# Patient Record
Sex: MALE | Race: WHITE | NOT HISPANIC OR LATINO | Employment: OTHER | URBAN - METROPOLITAN AREA
[De-identification: names, ages, dates, MRNs, and addresses within clinical notes are randomized per-mention and may not be internally consistent; named-entity substitution may affect disease eponyms.]

---

## 2019-10-14 PROBLEM — R03.0 ELEVATED BLOOD PRESSURE READING: Status: ACTIVE | Noted: 2019-10-14

## 2019-10-14 PROBLEM — I35.0 NONRHEUMATIC AORTIC VALVE STENOSIS: Status: ACTIVE | Noted: 2019-10-14

## 2021-04-12 PROBLEM — K80.10 CHOLELITHIASIS AND CHOLECYSTITIS WITHOUT OBSTRUCTION: Status: ACTIVE | Noted: 2021-04-12

## 2021-04-12 PROBLEM — R73.9 HYPERGLYCEMIA: Status: ACTIVE | Noted: 2021-04-12

## 2021-04-12 PROBLEM — K80.20 CALCULUS OF GALLBLADDER WITHOUT CHOLECYSTITIS WITHOUT OBSTRUCTION: Status: ACTIVE | Noted: 2021-04-12

## 2021-11-08 PROBLEM — K81.1 CHRONIC CHOLECYSTITIS: Status: ACTIVE | Noted: 2021-04-12

## 2022-08-30 PROBLEM — R03.0 ELEVATED BLOOD PRESSURE READING: Status: RESOLVED | Noted: 2019-10-14 | Resolved: 2022-08-30

## 2023-01-11 ENCOUNTER — DOCUMENTATION (OUTPATIENT)
Dept: CARDIOLOGY | Facility: MEDICAL CENTER | Age: 68
End: 2023-01-11

## 2023-01-11 NOTE — PROGRESS NOTES
This patient has been flagged through our echocardiogram surveillance with the following echocardiogram results:    Moderate Aortic Stenosis    Ordering Provider: Dr. Cachorro Rojas    Current Plan of Care for Structural Heart Disease: Added to surveillance tracking list    Next Echo date needed by: 12/13/2023

## 2024-05-07 PROBLEM — G47.33 OBSTRUCTIVE SLEEP APNEA SYNDROME: Status: ACTIVE | Noted: 2024-05-07

## 2024-05-23 ENCOUNTER — TELEPHONE (OUTPATIENT)
Dept: CARDIOLOGY | Facility: MEDICAL CENTER | Age: 69
End: 2024-05-23

## 2024-05-23 DIAGNOSIS — I35.0 AORTIC VALVE STENOSIS, SEVERE: ICD-10-CM

## 2024-05-23 NOTE — TELEPHONE ENCOUNTER
S/W pt, relayed findings and need to see provider asap. Pt states he has had some intermittent chest tightness, SOB with excessive activity--none at rest, feeling fatigued. ED precautions given for worsening/severe cardiac symptoms and relayed what to watch for in detail. Msg sent to scheduling pool as urgent to get pt scheduled.     Pt unaware that he was overdue for f/u, last saw DA in 2022.    Referral placed for structural to discuss valve replacement per CW.

## 2024-05-23 NOTE — TELEPHONE ENCOUNTER
----- Message from Kendra Wade R.N. sent at 5/23/2024 11:35 AM PDT -----  Regarding: FW: His Aortic Stenosis is now severe  Hi Chandrika,  last seen by CLAUDETTE, please follow up per ADD workflow, thank you!  ----- Message -----  From: Yusuf Rosas M.D.  Sent: 5/23/2024  11:24 AM PDT  To: Kendra Wade R.N.; Aleida Hoang, Med Ass't; #  Subject: His Aortic Stenosis is now severe                Kendra can you call and let him know his valve has tightened further so he should see us back to talk about replacement.    Aleida please help to get him in with one of the cardiologists next available

## 2024-05-24 ENCOUNTER — TELEPHONE (OUTPATIENT)
Dept: CARDIOLOGY | Facility: MEDICAL CENTER | Age: 69
End: 2024-05-24

## 2024-05-24 DIAGNOSIS — Z01.810 PRE-PROCEDURAL CARDIOVASCULAR EXAMINATION: ICD-10-CM

## 2024-05-24 DIAGNOSIS — I35.0 AORTIC VALVE STENOSIS, SEVERE: ICD-10-CM

## 2024-05-24 NOTE — TELEPHONE ENCOUNTER
Currently holding time on 06- for a PRE TAVR Angio for 7:30 time with TW. Instruction  sheet emailed to Armando.  
You can access the FollowMyHealth Patient Portal offered by [facility] by registering at the following website: [email or web address]. By joining [employer]s FollowMyHealth portal, you will also be able to view your health information using other applications (apps) compatible with our system.

## 2024-05-24 NOTE — TELEPHONE ENCOUNTER
Referral from: Dr. Rafal Renee for sev AS    Patient called on 5/24/2024.     Discussed with patient consultation appointments, testing needed, and plan of care.    Patient given dates and times of testing and consultations.    All questions answered.    Phone number given to patient for [facility] for any further questions or concerns.

## 2024-05-28 NOTE — PROGRESS NOTES
REFERRING PHYSICIAN: Scotty De Dios MD.     CONSULTING PHYSICIAN: Rossana Bernstein MD     CHIEF COMPLAINT: Fatigue    HISTORY OF PRESENT ILLNESS: The patient is a 68 y.o. male with past medical history of hypothyroidism, obstructive sleep apnea, GERD, moderate aortic stenosis who presents to clinic. He complains of shortness of breath, occasional chest soreness and exertional fatigue over the past year. He denies orthopnea, PND, dizziness and syncope. Echocardiogram shows progression of aortic stenosis from moderate to severe. He is accompanied by his wife and daughter.     PAST MEDICAL HISTORY:   Active Ambulatory Problems     Diagnosis Date Noted    Postoperative hypothyroidism 10/26/2015    Gastroesophageal reflux disease without esophagitis 10/26/2015    Rotator cuff syndrome of right shoulder 01/01/2013    Preventative health care 01/01/2013    History of benign prostatic hyperplasia 01/01/2013    Erectile dysfunction 01/01/2013    Severe aortic stenosis 10/14/2019    Cholelithiasis and cholecystitis without obstruction 04/12/2021    Type 2 diabetes mellitus with hyperosmolarity without coma, without long-term current use of insulin ([facility]) 04/12/2021    Obstructive sleep apnea syndrome 05/07/2024     Resolved Ambulatory Problems     Diagnosis Date Noted    Mild mitral regurgitation by prior echocardiogram 09/01/2013    GERD (gastroesophageal reflux disease) 02/27/2013    Impaired fasting glucose 01/01/2013    Hypothyroidism 01/01/2013    Elevated blood pressure reading 10/14/2019     Past Medical History:   Diagnosis Date    BPH (benign prostatic hyperplasia)     Hyperglycemia 04/12/2021    Moderate aortic stenosis 10/14/2019    Nonrheumatic aortic valve stenosis 10/14/2019       PAST SURGICAL HISTORY:   Past Surgical History:   Procedure Laterality Date    CHOLECYSTECTOMY      Jeff    THYROID LOBECTOMY      TURP-VAPOR          ALLERGIES: No Known Allergies     CURRENT MEDICATIONS:   Current Outpatient  Medications:     metFORMIN (GLUCOPHAGE) 500 MG Tab, Take 1 tablet by mouth once daily, Disp: 90 Tablet, Rfl: 3    levothyroxine (SYNTHROID) 125 MCG Tab, Take 1 tablet by mouth once daily, Disp: 90 Tablet, Rfl: 1    FAMILY HISTORY:   Family History   Problem Relation Age of Onset    Prostate cancer Brother     Arthritis Other     Heart Disease Other     Hypertension Other     Stroke Other         SOCIAL HISTORY:   Social History     Socioeconomic History    Marital status:      Spouse name: Nichole    Number of children: 2    Years of education: Not on file    Highest education level: Not on file   Occupational History    Occupation: retired     Comment: [employer]   Tobacco Use    Smoking status: Never    Smokeless tobacco: Never   Vaping Use    Vaping status: Never Used   Substance and Sexual Activity    Alcohol use: Yes     Alcohol/week: 4.2 oz     Types: 7 Cans of beer per week    Drug use: Not on file    Sexual activity: Not on file   Other Topics Concern     Service No    Blood Transfusions Not Asked    Caffeine Concern No    Occupational Exposure Not Asked    Hobby Hazards Not Asked    Sleep Concern Not Asked    Stress Concern Not Asked    Weight Concern Not Asked    Special Diet Not Asked    Back Care Not Asked    Exercise No    Bike Helmet Not Asked    Seat Belt Yes    Self-Exams Not Asked   Social History Narrative    Not on file     Social Determinants of Health     Financial Resource Strain: Not on file   Food Insecurity: Not on file   Transportation Needs: Not on file   Physical Activity: Inactive (11/8/2021)    Exercise Vital Sign     Days of Exercise per Week: 0 days     Minutes of Exercise per Session: 0 min   Stress: Not on file   Social Connections: Not on file   Intimate Partner Violence: Not on file   Housing Stability: Not on file       REVIEW OF SYSTEMS:  Review of Systems   Constitutional:  Positive for malaise/fatigue. Negative for chills, fever and weight loss.   HENT:   "Negative for ear pain, nosebleeds and tinnitus.    Eyes:  Negative for double vision, photophobia and pain.   Respiratory:  Positive for shortness of breath. Negative for cough and hemoptysis.    Cardiovascular:  Negative for chest pain, palpitations, orthopnea, leg swelling and PND.   Gastrointestinal:  Negative for abdominal pain, blood in stool, nausea and vomiting.   Genitourinary:  Negative for frequency, hematuria and urgency.   Musculoskeletal: Negative.    Skin:  Negative for rash.   Neurological:  Negative for dizziness, tremors, speech change, focal weakness, seizures and headaches.   Endo/Heme/Allergies:  Negative for polydipsia. Does not bruise/bleed easily.   Psychiatric/Behavioral:  Negative for hallucinations and memory loss.      PHYSICAL EXAMINATION:    /72 (BP Location: Left arm, Patient Position: Sitting, BP Cuff Size: Adult)   Pulse 86   Temp 36.6 °C (97.8 °F) (Temporal)   Ht 1.854 m (6' 1\")   Wt 102 kg (224 lb)   SpO2 94%   BMI 29.55 kg/m²    Physical Exam  Vitals reviewed.   Constitutional:       General: He is not in acute distress.     Appearance: Normal appearance.   HENT:      Head: Normocephalic and atraumatic.      Right Ear: External ear normal.      Left Ear: External ear normal.      Nose: Nose normal. No congestion.   Eyes:      General: No scleral icterus.     Extraocular Movements: Extraocular movements intact.      Conjunctiva/sclera: Conjunctivae normal.   Cardiovascular:      Rate and Rhythm: Normal rate and regular rhythm.   Pulmonary:      Effort: Pulmonary effort is normal. No respiratory distress.   Abdominal:      General: There is no distension.      Palpations: Abdomen is soft.   Musculoskeletal:         General: Normal range of motion.      Cervical back: Normal range of motion.   Skin:     General: Skin is warm and dry.      Coloration: Skin is not jaundiced.      Findings: No rash.   Neurological:      Mental Status: He is alert and oriented to person, " "place, and time.      Cranial Nerves: No cranial nerve deficit.   Psychiatric:         Mood and Affect: Mood normal.         Behavior: Behavior normal.       LABS REVIEWED:  Lab Results   Component Value Date/Time    SODIUM 140 06/04/2024 12:55 PM    POTASSIUM 4.1 06/04/2024 12:55 PM    CHLORIDE 104 06/04/2024 12:55 PM    CO2 28 06/04/2024 12:55 PM    GLUCOSE 108 (H) 06/04/2024 12:55 PM    BUN 18 06/04/2024 12:55 PM    CREATININE 1.1 06/04/2024 12:55 PM    BUNCREATRAT 16 07/26/2017 10:15 AM      No results found for: \"PROTHROMBTM\", \"INR\"   Lab Results   Component Value Date/Time    WBC 5.5 06/22/2023 08:12 AM    RBC 5.09 06/22/2023 08:12 AM    HEMOGLOBIN 15.2 06/22/2023 08:12 AM    HEMATOCRIT 44.7 06/22/2023 08:12 AM    MCV 87.8 06/22/2023 08:12 AM    MCH 29.9 06/22/2023 08:12 AM    MCHC 34.0 06/22/2023 08:12 AM    MPV 12.2 (H) 06/22/2023 08:12 AM        IMAGING REVIEWED AND INTERPRETED:    ECHOCARDIOGRAM   5/23/24 @ [facility]  CONCLUSIONS  Compared to the images of the prior study on 12/13/2022 - aortic   stenosis is now severe, previously moderate.  Normal left ventricular systolic function.  The left ventricular ejection fraction is visually estimated to be 60%.  Normal diastolic function.  The right ventricle is normal in size and systolic function.  Severe aortic valve stenosis. (AV Peak Velo: 4.4m/s; AV mean gradient: 50.2mmHg; PARMINDER: 0.67cm2)  Mild aortic insufficiency.    CARDIAC CATHETERIZATION pending    CT SCAN CHEST   Performed this morning; read pending      IMPRESSION:  67 yo gentleman with known conditions of hypothyroidism, obstructive sleep apnea, GERD has been monitored for progression of known aortic stenosis. He now has severe symptomatic aortic stenosis.       PLAN:  I recommend that the patient is a reasonable TAVR candidate given patient preference and age. We will continue to follow workup and discuss in multidisciplinary conference.    The STS mortality risk score is 0.5% and the morbidity and " mortality risk score is 4.3%. The scores were discussed with patient.    Thank you for this very challenging consultation and participation in the patient’s care.  I will keep you apprised of all future developments.          Sincerely,       Rossana Bernstein MD.

## 2024-06-05 ENCOUNTER — OFFICE VISIT (OUTPATIENT)
Dept: CARDIOLOGY | Facility: MEDICAL CENTER | Age: 69
End: 2024-06-05
Attending: INTERNAL MEDICINE
Payer: MEDICARE

## 2024-06-05 ENCOUNTER — TELEPHONE (OUTPATIENT)
Dept: CARDIOLOGY | Facility: MEDICAL CENTER | Age: 69
End: 2024-06-05

## 2024-06-05 ENCOUNTER — OFFICE VISIT (OUTPATIENT)
Dept: CARDIOTHORACIC SURGERY | Facility: MEDICAL CENTER | Age: 69
End: 2024-06-05
Payer: MEDICARE

## 2024-06-05 ENCOUNTER — DOCUMENTATION (OUTPATIENT)
Dept: CARDIOLOGY | Facility: MEDICAL CENTER | Age: 69
End: 2024-06-05

## 2024-06-05 ENCOUNTER — HOSPITAL ENCOUNTER (OUTPATIENT)
Dept: RADIOLOGY | Facility: MEDICAL CENTER | Age: 69
End: 2024-06-05
Payer: MEDICARE

## 2024-06-05 ENCOUNTER — APPOINTMENT (OUTPATIENT)
Dept: ADMISSIONS | Facility: MEDICAL CENTER | Age: 69
End: 2024-06-05
Attending: INTERNAL MEDICINE
Payer: MEDICARE

## 2024-06-05 VITALS
OXYGEN SATURATION: 99 % | WEIGHT: 224 LBS | DIASTOLIC BLOOD PRESSURE: 76 MMHG | BODY MASS INDEX: 29.69 KG/M2 | RESPIRATION RATE: 16 BRPM | HEART RATE: 67 BPM | HEIGHT: 73 IN | SYSTOLIC BLOOD PRESSURE: 128 MMHG

## 2024-06-05 VITALS
BODY MASS INDEX: 29.69 KG/M2 | OXYGEN SATURATION: 94 % | HEART RATE: 86 BPM | TEMPERATURE: 97.8 F | DIASTOLIC BLOOD PRESSURE: 72 MMHG | HEIGHT: 73 IN | WEIGHT: 224 LBS | SYSTOLIC BLOOD PRESSURE: 124 MMHG

## 2024-06-05 DIAGNOSIS — I35.0 NONRHEUMATIC AORTIC VALVE STENOSIS: ICD-10-CM

## 2024-06-05 DIAGNOSIS — Z01.810 PRE-PROCEDURAL CARDIOVASCULAR EXAMINATION: ICD-10-CM

## 2024-06-05 DIAGNOSIS — Z00.6 EXAMINATION OF PARTICIPANT IN CLINICAL TRIAL: ICD-10-CM

## 2024-06-05 DIAGNOSIS — I35.0 AORTIC VALVE STENOSIS, SEVERE: ICD-10-CM

## 2024-06-05 DIAGNOSIS — I35.0 SEVERE AORTIC STENOSIS: ICD-10-CM

## 2024-06-05 DIAGNOSIS — Z01.810 PRE-OPERATIVE CARDIOVASCULAR EXAMINATION: ICD-10-CM

## 2024-06-05 PROBLEM — E11.00 TYPE 2 DIABETES MELLITUS WITH HYPEROSMOLARITY WITHOUT COMA, WITHOUT LONG-TERM CURRENT USE OF INSULIN (HCC): Status: ACTIVE | Noted: 2021-04-12

## 2024-06-05 PROCEDURE — 3078F DIAST BP <80 MM HG: CPT | Performed by: INTERNAL MEDICINE

## 2024-06-05 PROCEDURE — 71275 CT ANGIOGRAPHY CHEST: CPT

## 2024-06-05 PROCEDURE — G2211 COMPLEX E/M VISIT ADD ON: HCPCS | Performed by: INTERNAL MEDICINE

## 2024-06-05 PROCEDURE — 99205 OFFICE O/P NEW HI 60 MIN: CPT | Performed by: INTERNAL MEDICINE

## 2024-06-05 PROCEDURE — 99213 OFFICE O/P EST LOW 20 MIN: CPT | Mod: 25 | Performed by: INTERNAL MEDICINE

## 2024-06-05 PROCEDURE — 99205 OFFICE O/P NEW HI 60 MIN: CPT | Performed by: THORACIC SURGERY (CARDIOTHORACIC VASCULAR SURGERY)

## 2024-06-05 PROCEDURE — 700117 HCHG RX CONTRAST REV CODE 255

## 2024-06-05 PROCEDURE — 3074F SYST BP LT 130 MM HG: CPT | Performed by: INTERNAL MEDICINE

## 2024-06-05 PROCEDURE — 3078F DIAST BP <80 MM HG: CPT | Performed by: THORACIC SURGERY (CARDIOTHORACIC VASCULAR SURGERY)

## 2024-06-05 PROCEDURE — 3074F SYST BP LT 130 MM HG: CPT | Performed by: THORACIC SURGERY (CARDIOTHORACIC VASCULAR SURGERY)

## 2024-06-05 RX ADMIN — IOHEXOL 100 ML: 350 INJECTION, SOLUTION INTRAVENOUS at 08:00

## 2024-06-05 ASSESSMENT — ENCOUNTER SYMPTOMS
SHORTNESS OF BREATH: 1
MEMORY LOSS: 0
DOUBLE VISION: 0
FOCAL WEAKNESS: 0
SPEECH CHANGE: 0
DIZZINESS: 0
SEIZURES: 0
HEMOPTYSIS: 0
HEADACHES: 0
PHOTOPHOBIA: 0
TREMORS: 0
VOMITING: 0
CHILLS: 0
BRUISES/BLEEDS EASILY: 0
COUGH: 0
MUSCULOSKELETAL NEGATIVE: 1
PALPITATIONS: 0
BLOOD IN STOOL: 0
ABDOMINAL PAIN: 0
EYE PAIN: 0
NAUSEA: 0
PND: 0
WEIGHT LOSS: 0
HALLUCINATIONS: 0
POLYDIPSIA: 0
ORTHOPNEA: 0
FEVER: 0

## 2024-06-05 ASSESSMENT — FIBROSIS 4 INDEX
FIB4 SCORE: 1.2
FIB4 SCORE: 1.2

## 2024-06-05 ASSESSMENT — PATIENT HEALTH QUESTIONNAIRE - PHQ9: CLINICAL INTERPRETATION OF PHQ2 SCORE: 0

## 2024-06-05 NOTE — TELEPHONE ENCOUNTER
----- Message from Dori Irby R.N. sent at 5/24/2024  1:59 PM PDT -----  Regarding: pre tavr cath  Please hold pre tavr cath 6/11. Thanks!

## 2024-06-05 NOTE — TELEPHONE ENCOUNTER
----- Message from Dori Irby R.N. sent at 5/24/2024  1:59 PM PDT -----  Regarding: pre tavr cath  Please hold pre tavr cath 6/11. Thanks!    
Patient is scheduled on 6-11-24 for a Pre TAVR angio with Dr. Gallagher.Patient was told to hold Metformin day of and 48hrs after. Patient to check in at 6:00 for a 7:30 procedure. H&P was done on 6-5-24 by Dr. De Dios. Pre admit to call patient.  
Scheduled case with Alan. Emailed case to Giovanna Phillips and Alix.  
n/a

## 2024-06-05 NOTE — PROGRESS NOTES
"CARDIOLOGY STRUCTURAL HEART CONSULTATION    PCP: Rafal Renee M.D.  REFERRING: Yusuf Rosas MD    1. Nonrheumatic aortic valve stenosis    2. Pre-operative cardiovascular examination        Venkat Aivtia has class II D1 aortic stenosis.  Advise moving forward with aortic valve replacement-TAVR versus SAVR pending heart team evaluation.  He will complete the usual prerequisite testing of a CT, coronary angiogram and CTS evaluation.    We discussed the risks, benefits and alternatives to TAVR including but not limited to a 10% risk of pacemaker, 5% risk of bleeding/access site complication, 2% risk of stroke, risk of contrast nephropathy and 2% risk of mortality. The patient is in agreement with proceeding.      Follow up: 6 wks    History: Venkat Avitia is a 68 y.o. male active man who presents for shortness of breath exertional fatigue over the past year.  Rides dirt bikes, offroad vehicles during which she has noticed some of the symptoms.  Echocardiogram showing progression from moderate to severe.      ROS:   10 point review systems is otherwise negative except as per the HPI    PE:  /76 (BP Location: Left arm, Patient Position: Sitting, BP Cuff Size: Adult)   Pulse 67   Resp 16   Ht 1.854 m (6' 1\")   Wt 102 kg (224 lb)   SpO2 99%   BMI 29.55 kg/m²   GEN: NAD  CARDIAC: Systolic ejection murmur  RESP: Clear to auscultation bilaterally  ABD: Soft, non-tender, non-distended  EXT: No edema  NEURO: No focal deficit    () Today's E/M visit is associated with medical care services that serve as the continuing focal point for all needed health care services and/or with medical care services that  are part of ongoing care related to a patient's single, serious condition, or a complex condition: This includes  furnishing services to patients on an ongoing basis that result in care that is personalized  to the patient. The services result in a comprehensive, longitudinal, and " continuous  relationship with the patient and involve delivery of team-based care that is accessible, coordinated with other practitioners and providers, and integrated with the broader health  care landscape.     Labs collected 3/22/2024 creatinine 1.1, AST/ALT normal, bilirubin normal, glucose 101, potassium 4.5, hematocrit 45.7, hemoglobin 14.8    I interpreted the echocardiogram dated 5/23/2024 which shows severe valvular aortic stenosis, normal LVEF, mild aortic insufficiency    Past Medical History:   Diagnosis Date    BPH (benign prostatic hyperplasia)     Hyperglycemia 04/12/2021    Mild mitral regurgitation by prior echocardiogram 09/2013    nl ef and mild concentric LVH    Moderate aortic stenosis 10/14/2019    Nonrheumatic aortic valve stenosis 10/14/2019    Postoperative hypothyroidism 10/26/2015     Past Surgical History:   Procedure Laterality Date    CHOLECYSTECTOMY      Jeff    THYROID LOBECTOMY      TURP-VAPOR       No Known Allergies  Outpatient Encounter Medications as of 6/5/2024   Medication Sig Dispense Refill    metFORMIN (GLUCOPHAGE) 500 MG Tab Take 1 tablet by mouth once daily 90 Tablet 3    levothyroxine (SYNTHROID) 125 MCG Tab Take 1 tablet by mouth once daily 90 Tablet 1    Levothyroxine Sodium 125 MCG Cap Take 1 Capsule by mouth every day. 90 Capsule 3    pantoprazole (PROTONIX) 40 MG Tablet Delayed Response Take 1 tablet by mouth once daily (Patient not taking: Reported on 5/7/2024) 90 Tab 1     No facility-administered encounter medications on file as of 6/5/2024.     Social History     Socioeconomic History    Marital status:      Spouse name: Nichole    Number of children: 2    Years of education: Not on file    Highest education level: Not on file   Occupational History    Occupation: retired     Comment: [employer]   Tobacco Use    Smoking status: Never    Smokeless tobacco: Never   Vaping Use    Vaping status: Never Used   Substance and Sexual Activity    Alcohol use:  "Yes     Alcohol/week: 4.2 oz     Types: 7 Cans of beer per week    Drug use: Not on file    Sexual activity: Not on file   Other Topics Concern     Service No    Blood Transfusions Not Asked    Caffeine Concern No    Occupational Exposure Not Asked    Hobby Hazards Not Asked    Sleep Concern Not Asked    Stress Concern Not Asked    Weight Concern Not Asked    Special Diet Not Asked    Back Care Not Asked    Exercise No    Bike Helmet Not Asked    Seat Belt Yes    Self-Exams Not Asked   Social History Narrative    Not on file     Social Determinants of Health     Financial Resource Strain: Not on file   Food Insecurity: Not on file   Transportation Needs: Not on file   Physical Activity: Inactive (11/8/2021)    Exercise Vital Sign     Days of Exercise per Week: 0 days     Minutes of Exercise per Session: 0 min   Stress: Not on file   Social Connections: Not on file   Intimate Partner Violence: Not on file   Housing Stability: Not on file     Family History   Problem Relation Age of Onset    Prostate cancer Brother     Arthritis Other     Heart Disease Other     Hypertension Other     Stroke Other          Studies  Lab Results   Component Value Date/Time    CHOLSTRLTOT 134 08/17/2023 08:19 AM    LDL 50 08/17/2023 08:19 AM    HDL 63.0 (H) 08/17/2023 08:19 AM    TRIGLYCERIDE 105 08/17/2023 08:19 AM       Lab Results   Component Value Date/Time    SODIUM 140 06/04/2024 12:55 PM    POTASSIUM 4.1 06/04/2024 12:55 PM    CHLORIDE 104 06/04/2024 12:55 PM    CO2 28 06/04/2024 12:55 PM    GLUCOSE 108 (H) 06/04/2024 12:55 PM    BUN 18 06/04/2024 12:55 PM    CREATININE 1.1 06/04/2024 12:55 PM    BUNCREATRAT 16 07/26/2017 10:15 AM     Lab Results   Component Value Date/Time    ALKPHOSPHAT 67 06/04/2024 12:55 PM    ASTSGOT 21 06/04/2024 12:55 PM    ALTSGPT 36 06/04/2024 12:55 PM    TBILIRUBIN 0.4 06/04/2024 12:55 PM      No results found for: \"HGB\"       "

## 2024-06-05 NOTE — PROGRESS NOTES
Valve Program Functional Assessment:     KCCQ12   1a) Showering/bathin  1b) Walking 1 block on ground: 4  1c) Hurrying or joggin  2) Swellin  3) Fatigue: 7  4) Shortness of breath: 7  5) Sleep sitting up: 5  6) Limited enjoyment of life: 4  7) Spend the rest of your life with HF: 2  8a) Hobbies, recreational activities:3  8b) Working or doing household chores:3  8c) Visiting family or friends: 5    5 meter walk test  1) __3.01____ s/5m  2) __3.25____ s/5m  3) __3.63____ s/5m  AVG:_3.29______     Strength   1) _38_____ kg  2) _38_____ kg  3) _36_____ kg  AVG:__37.3____    HARKINS ADLs  Patient independently preforms...   - Bathing: Yes   - Dressing: Yes   - Toileting: Yes   - Transferring: Yes   - Continence: Yes   - Feeding: Yes   Total Score: _6_/6    Living Situation  Patient lives: with spouse,    Mobility Aids   Patient uses: none      FRAILTY SCORE: _0_/ 4

## 2024-06-05 NOTE — PROGRESS NOTES
"Valve Program Consultation: 6/5/2024 for tentative TAVR 6/17/2024    Mental Status Assessment:  Appearance: normal  Behavior: normal  Mood/Affect: normal  Thought process/content: normal  Cognition: normal  Functional ability: normal  Dental Concerns: natural teeth with crowns; patient denies s/s of active oral infection/irritation  Further mental assessment needed: no    Post-op Plan of Care:  Support systems: spouse Nichole, daughter Karen both at consult today and participating in the discussion  Patient understands discharge plan: yes  DME: hearing aids, glasses  Home health warranted prior to procedure: no  Social concerns for discharge: none  PCP alerted of social concerns: no  POLST: none  Advance directive: DPOA-HC on file dated 1/28/2020  Post-op goal: \"have less fatigue so I can be more active, overall better health\"   Medication instructions: hold vitamins and supplements 7 days prior, hold Metformin the morning of the procedure    Concerns prior to procedure: none    Met with patient during New TAVR consult.     All patient's questions and concerns were addressed during this visit. They understood pre-operative and post-operative plan of care.    Reviewed patient TAVR education packet explaining that information is provided regarding preparation for TAVR the night prior, what to expect during the hospital stay, average LOS, and what to look out for post TAVR discharge. Explained that patient will require SBE prophylactic antibiotic prior to any dental treatment post TAVR. Advised patient not to receive any new vaccinations within 1 week pre- and 1 week post-procedure.     Explained that patient should not eat or drink anything past midnight the day of the procedure. Encouraged patient to wear something clean and comfortable, easy to get on/off to check in Monday morning, time TBD. Patient may have friends and family in the pre-operational area until patient is taken to the operating room, at which time " family and friends will be asked to wait on floor 1 [facility] surgical waiting area. On completion of TAVR, heart team will update family. Once update is given, there is some time before family/friends may visit patient in their assigned room. Length of stay on average is one night, however patient may stay longer depending on specific needs at that time. Patient given printed instructions sheet with all above stated instructions. Patient states understanding of all material and education presented today and has no further questions at this time. Encouraged patient again, to contact me with any questions or concerns during this work-up process. Patient states understanding.

## 2024-06-05 NOTE — TELEPHONE ENCOUNTER
Patient is scheduling on 6-11-24 for a Pre TAVR angio with Dr. Gallagher. Patient was told to hold Metformin day of and 48hrs after. Patient to check in at 6:00 for a 7:30 procedure.H&P was done on 6-5-24 by . Pre admit to call patient.

## 2024-06-07 ENCOUNTER — DOCUMENTATION (OUTPATIENT)
Dept: CARDIOLOGY | Facility: MEDICAL CENTER | Age: 69
End: 2024-06-07

## 2024-06-07 ENCOUNTER — PRE-ADMISSION TESTING (OUTPATIENT)
Dept: ADMISSIONS | Facility: MEDICAL CENTER | Age: 69
End: 2024-06-07
Attending: INTERNAL MEDICINE
Payer: MEDICARE

## 2024-06-07 RX ORDER — UBIDECARENONE 75 MG
100 CAPSULE ORAL DAILY
COMMUNITY

## 2024-06-07 NOTE — OR NURSING
PAT completed for 6/11/24 procedure. Patient states he is to have a meeting with cardiology after this procedure to discuss TAVR vs open heart treatment plan. Patient requests postponing PAT for 6/17/24 until this conversation has taken place.     PAT for 6/17/24 NOT complete. Case msg to [facility] to notify.     Patient provided medication and fasting instructions, per cardiology orders. Bathing instructions provided per Preparing For Your Procedure information packet. Patient instructed to follow up with their surgeon for any additional questions or concerns.     Patient verbalized understanding of their instructions.    Patient unable to come in for preadmit testing, lives out of town. Please do DOS.

## 2024-06-07 NOTE — PROGRESS NOTES
Tim TAVR review: Please use BAV to confirm sizing and possible LCA/RCA occlusion due to functional bicuspid.  Consider a 26/29 S3UR from a left femoral approach.  Small annular calcium  Functional bicuspid  LCA (11mm) and RCA (11.5mm) considered low due to functional bicuspid  Non calcified tortuous iliacs bilaterally   Very high right bifurcation  Ca1

## 2024-06-11 ENCOUNTER — HOSPITAL ENCOUNTER (OUTPATIENT)
Facility: MEDICAL CENTER | Age: 69
End: 2024-06-11
Attending: INTERNAL MEDICINE | Admitting: INTERNAL MEDICINE
Payer: MEDICARE

## 2024-06-11 ENCOUNTER — APPOINTMENT (OUTPATIENT)
Dept: CARDIOLOGY | Facility: MEDICAL CENTER | Age: 69
End: 2024-06-11
Attending: INTERNAL MEDICINE
Payer: MEDICARE

## 2024-06-11 VITALS
WEIGHT: 220.9 LBS | HEART RATE: 57 BPM | BODY MASS INDEX: 29.28 KG/M2 | HEIGHT: 73 IN | RESPIRATION RATE: 14 BRPM | OXYGEN SATURATION: 93 % | DIASTOLIC BLOOD PRESSURE: 55 MMHG | TEMPERATURE: 97.1 F | SYSTOLIC BLOOD PRESSURE: 113 MMHG

## 2024-06-11 DIAGNOSIS — I35.0 AORTIC VALVE STENOSIS, SEVERE: ICD-10-CM

## 2024-06-11 DIAGNOSIS — Z01.810 PRE-PROCEDURAL CARDIOVASCULAR EXAMINATION: ICD-10-CM

## 2024-06-11 LAB
ALBUMIN SERPL BCP-MCNC: 4.3 G/DL (ref 3.2–4.9)
ALBUMIN/GLOB SERPL: 1.5 G/DL
ALP SERPL-CCNC: 65 U/L (ref 30–99)
ALT SERPL-CCNC: 24 U/L (ref 2–50)
ANION GAP SERPL CALC-SCNC: 14 MMOL/L (ref 7–16)
APTT PPP: 31.1 SEC (ref 24.7–36)
AST SERPL-CCNC: 23 U/L (ref 12–45)
BILIRUB SERPL-MCNC: 0.3 MG/DL (ref 0.1–1.5)
BUN SERPL-MCNC: 18 MG/DL (ref 8–22)
CALCIUM ALBUM COR SERPL-MCNC: 9.1 MG/DL (ref 8.5–10.5)
CALCIUM SERPL-MCNC: 9.3 MG/DL (ref 8.5–10.5)
CHLORIDE SERPL-SCNC: 103 MMOL/L (ref 96–112)
CO2 SERPL-SCNC: 22 MMOL/L (ref 20–33)
CREAT SERPL-MCNC: 0.95 MG/DL (ref 0.5–1.4)
EKG IMPRESSION: NORMAL
ERYTHROCYTE [DISTWIDTH] IN BLOOD BY AUTOMATED COUNT: 43.6 FL (ref 35.9–50)
GFR SERPLBLD CREATININE-BSD FMLA CKD-EPI: 87 ML/MIN/1.73 M 2
GLOBULIN SER CALC-MCNC: 2.8 G/DL (ref 1.9–3.5)
GLUCOSE SERPL-MCNC: 122 MG/DL (ref 65–99)
HCT VFR BLD AUTO: 45.6 % (ref 42–52)
HGB BLD-MCNC: 15.8 G/DL (ref 14–18)
INR PPP: 1 (ref 0.87–1.13)
MCH RBC QN AUTO: 30 PG (ref 27–33)
MCHC RBC AUTO-ENTMCNC: 34.6 G/DL (ref 32.3–36.5)
MCV RBC AUTO: 86.7 FL (ref 81.4–97.8)
NT-PROBNP SERPL IA-MCNC: 873 PG/ML (ref 0–125)
PLATELET # BLD AUTO: 176 K/UL (ref 164–446)
PMV BLD AUTO: 12.3 FL (ref 9–12.9)
POTASSIUM SERPL-SCNC: 3.9 MMOL/L (ref 3.6–5.5)
PROT SERPL-MCNC: 7.1 G/DL (ref 6–8.2)
PROTHROMBIN TIME: 13.3 SEC (ref 12–14.6)
RBC # BLD AUTO: 5.26 M/UL (ref 4.7–6.1)
SODIUM SERPL-SCNC: 139 MMOL/L (ref 135–145)
WBC # BLD AUTO: 6.2 K/UL (ref 4.8–10.8)

## 2024-06-11 PROCEDURE — 93005 ELECTROCARDIOGRAM TRACING: CPT | Performed by: INTERNAL MEDICINE

## 2024-06-11 PROCEDURE — 700117 HCHG RX CONTRAST REV CODE 255: Performed by: INTERNAL MEDICINE

## 2024-06-11 PROCEDURE — 700111 HCHG RX REV CODE 636 W/ 250 OVERRIDE (IP): Mod: JZ

## 2024-06-11 PROCEDURE — G0278 ILIAC ART ANGIO,CARDIAC CATH: HCPCS | Performed by: INTERNAL MEDICINE

## 2024-06-11 PROCEDURE — 160002 HCHG RECOVERY MINUTES (STAT)

## 2024-06-11 PROCEDURE — 93567 NJX CAR CTH SPRVLV AORTGRPHY: CPT | Performed by: INTERNAL MEDICINE

## 2024-06-11 PROCEDURE — 85730 THROMBOPLASTIN TIME PARTIAL: CPT

## 2024-06-11 PROCEDURE — 700101 HCHG RX REV CODE 250

## 2024-06-11 PROCEDURE — 80053 COMPREHEN METABOLIC PANEL: CPT

## 2024-06-11 PROCEDURE — 85027 COMPLETE CBC AUTOMATED: CPT

## 2024-06-11 PROCEDURE — 93454 CORONARY ARTERY ANGIO S&I: CPT | Mod: 26 | Performed by: INTERNAL MEDICINE

## 2024-06-11 PROCEDURE — 160035 HCHG PACU - 1ST 60 MINS PHASE I

## 2024-06-11 PROCEDURE — 85610 PROTHROMBIN TIME: CPT

## 2024-06-11 PROCEDURE — 99152 MOD SED SAME PHYS/QHP 5/>YRS: CPT | Performed by: INTERNAL MEDICINE

## 2024-06-11 PROCEDURE — A9270 NON-COVERED ITEM OR SERVICE: HCPCS

## 2024-06-11 PROCEDURE — 160046 HCHG PACU - 1ST 60 MINS PHASE II

## 2024-06-11 PROCEDURE — 700102 HCHG RX REV CODE 250 W/ 637 OVERRIDE(OP)

## 2024-06-11 PROCEDURE — 83880 ASSAY OF NATRIURETIC PEPTIDE: CPT

## 2024-06-11 PROCEDURE — 99152 MOD SED SAME PHYS/QHP 5/>YRS: CPT

## 2024-06-11 PROCEDURE — 160036 HCHG PACU - EA ADDL 30 MINS PHASE I

## 2024-06-11 PROCEDURE — 93010 ELECTROCARDIOGRAM REPORT: CPT | Performed by: INTERNAL MEDICINE

## 2024-06-11 RX ORDER — ASPIRIN 81 MG/1
TABLET, CHEWABLE ORAL
Status: COMPLETED
Start: 2024-06-11 | End: 2024-06-11

## 2024-06-11 RX ORDER — MIDAZOLAM HYDROCHLORIDE 1 MG/ML
INJECTION INTRAMUSCULAR; INTRAVENOUS
Status: COMPLETED
Start: 2024-06-11 | End: 2024-06-11

## 2024-06-11 RX ORDER — HEPARIN SODIUM 200 [USP'U]/100ML
INJECTION, SOLUTION INTRAVENOUS
Status: COMPLETED
Start: 2024-06-11 | End: 2024-06-11

## 2024-06-11 RX ORDER — SODIUM CHLORIDE 9 MG/ML
INJECTION, SOLUTION INTRAVENOUS CONTINUOUS
Status: DISCONTINUED | OUTPATIENT
Start: 2024-06-11 | End: 2024-06-11 | Stop reason: HOSPADM

## 2024-06-11 RX ORDER — LIDOCAINE HYDROCHLORIDE 20 MG/ML
INJECTION, SOLUTION INFILTRATION; PERINEURAL
Status: COMPLETED
Start: 2024-06-11 | End: 2024-06-11

## 2024-06-11 RX ORDER — VERAPAMIL HYDROCHLORIDE 2.5 MG/ML
INJECTION, SOLUTION INTRAVENOUS
Status: COMPLETED
Start: 2024-06-11 | End: 2024-06-11

## 2024-06-11 RX ORDER — HEPARIN SODIUM 1000 [USP'U]/ML
INJECTION, SOLUTION INTRAVENOUS; SUBCUTANEOUS
Status: COMPLETED
Start: 2024-06-11 | End: 2024-06-11

## 2024-06-11 RX ADMIN — ASPIRIN 324 MG: 81 TABLET, CHEWABLE ORAL at 08:00

## 2024-06-11 RX ADMIN — HEPARIN SODIUM: 1000 INJECTION, SOLUTION INTRAVENOUS; SUBCUTANEOUS at 07:30

## 2024-06-11 RX ADMIN — NITROGLYCERIN 10 ML: 20 INJECTION INTRAVENOUS at 07:30

## 2024-06-11 RX ADMIN — VERAPAMIL HYDROCHLORIDE 2.5 MG: 2.5 INJECTION, SOLUTION INTRAVENOUS at 07:30

## 2024-06-11 RX ADMIN — LIDOCAINE HYDROCHLORIDE: 20 INJECTION, SOLUTION INFILTRATION; PERINEURAL at 07:30

## 2024-06-11 RX ADMIN — IOHEXOL 80 ML: 350 INJECTION, SOLUTION INTRAVENOUS at 08:26

## 2024-06-11 RX ADMIN — MIDAZOLAM HYDROCHLORIDE 2 MG: 1 INJECTION, SOLUTION INTRAMUSCULAR; INTRAVENOUS at 08:17

## 2024-06-11 RX ADMIN — HEPARIN SODIUM 2000 UNITS: 200 INJECTION, SOLUTION INTRAVENOUS at 07:30

## 2024-06-11 RX ADMIN — FENTANYL CITRATE 100 MCG: 50 INJECTION, SOLUTION INTRAMUSCULAR; INTRAVENOUS at 08:17

## 2024-06-11 ASSESSMENT — FIBROSIS 4 INDEX
FIB4 SCORE: 1.2
FIB4 SCORE: 1.2

## 2024-06-11 NOTE — PROCEDURES
Cardiac Catheterization report    6/11/2024  8:35 AM    Referring MD: Dr. De Dios    Indication/Preoperative diagnosis:  Severe aortic stenosis    Postoperative diagnosis:  No epicardial coronary artery disease  Normal caliber thoracic and abdominal aorta    Recommendations:  Guideline directed medical therapy   Cardiovascular Risk factor modification      Procedures performed:  Coronary arteriograms  Aortic root arteriogram   Abdominal aortogram with bilateral iliofemoral runoff  Supervision moderate sedation      FINDINGS:  I.  HEMODYNAMICS   Ao: 86/56 mmHg       II. CORONARY ANGIOGRAPHY:  Left main coronary artery: Large bifurcating no CAD  Left anterior descending artery: Large-caliber no CAD  Left circumflex coronary artery: Large-caliber no CAD  Right coronary artery: Moderate caliber nondominant no CAD    III.  Thoracic aortogram:  Normal caliber thoracic aorta without significant calcification.    IV.  Abdominal aortogram  Normal abdominal aortogram with iliofemoral runoff.    Procedure details:  The risks and benefits of cardiac catheterization and possible intervention were explained to the patient including death, heart attack, stroke, and emergency surgery.  The patient verbalized understanding and wished to proceed.  The patient was brought to the cardiac catheterization laboratory in the fasting state and prepped and draped in the usual sterile fashion.  The right wrist was locally anesthetized with lidocaine and the right radial artery was cannulated with 5/6-Austrian equipment and standard radial cocktail was given.  Coronary angiography was performed using standard  diagnostic catheters in the usual fashion and exchanged for 4 Austrian pigtail catheter seated above the aortic valve contrast was administered for thoracic aortogram.  Subsequently was positioned into the distal abdominal aorta and a separate injection was completed for abdominal aortogram with bilateral iliofemoral runoff.. All catheters and  guidewires were removed.  A TR-Band was placed without difficulty to achieve patent hemostasis.  Patient tolerated procedure well left the Cath Lab in stable condition.    Complications:  None apparent    Sedation time:  Moderate sedation directly monitored by me during the case while supervising the administration of the sedation medication by an independent trained RN to assist in the monitoring of the patient's level of consciousness and physiological status. I, the supervising physician was present the entire time from beginning of medication administration until the end of the procedure from 8:09 AM until 8:28 AM. For detailed administration records please see the moderate sedation documentation in the media tab.    Following the procedure I discussed the results with the patient and the patients designated contact/family member .    Mc Gallagher MD, FACC, [facility]

## 2024-06-11 NOTE — OR NURSING
0837 Patient returned from cath lab. Patient is awake and oriented and on room air. . Confirmed placement with tech. TR band with 15 cc present on right wrist. Cath site soft and clean. Bilateral radial pulses present and equal. Post sedation vitals initiated.      0855 wife updated patient back in recovery.    0937 3 ml removed from TR band.  0952 3 ml removed from TR band  1007 3 ml removed form TR band.  1022 3 ml removed from TR band  1042 TR band removed, no bleeding at site, site soft, dressing applied.     1057 patient up to bathroom, getting dressed, steady gait, discharge instructions given and patient verbalized understanding, wife here for .

## 2024-06-11 NOTE — DISCHARGE INSTRUCTIONS
If any questions arise, call your provider.  If your provider is not available, please feel free to call the Surgical Center at [phone].    MEDICATIONS: Resume taking daily medication.  Take prescribed pain medication with food.  If no medication is prescribed, you may take non-aspirin pain medication if needed.  PAIN MEDICATION CAN BE VERY CONSTIPATING.  Take a stool softener or laxative such as senokot, pericolace, or milk of magnesia if needed.    Last pain medication given at         POST ANGIOGRAM  General Care Instructions  Maintain a bandage over the incision site for 24 hours.  It's normal to find a small bruise or dime-sized lump at the insertion site. This should disappear within a few weeks.  Do not apply lotions or powders to the site.  Do not immerse the catheter insertion site in water (bathtub/swimming) for five days. It is ok to shower 24 hours after the procedure.  You may resume your normal diet immediately; on the day of your procedure, drink 6-10 glasses of water to help flush the contrast liquid out of your system.  If the doctor inserted the catheter in through your groin:  Walking short distances on a flat surface is OK. Limit going up/down stairs for the first 2 days.  DO NOT do yard work, drive, squat, lift heavy objects, or play sports for 2 days; or until your health care provider tells you it is OK.  If the doctor inserted the catheter in your arm:  For 3 days, DO NOT lift anything heavier than 10 pounds (approximately a gallon of milk). DO NOT do any heavy pushing, pulling, or twisting.    Medications  If your current medications need to be changed, you will be provided with an updated list of your medications prior to discharge.  If you take warfarin (Coumadin), resume taking your usual dose the evening after the procedure.  DO NOT STOP taking prescribed blood thinning (anti-platelet) medications unless instructed by your cardiologist.  These medications include:  Aspirin,  Clopidogrel (Plavix), Ticagrelor (Brilinta), or Prasugrel (Effient)   If you take one of the following anticoagulants, RESUME 24 HOURS after your procedure:  Apixiban (Eliquis), Rivaroxaban (Xarelto), Dabigatran (Pradaxa), Edoxaban (Savaysa)  If you take metformin (Glucophage), RESUME 48 HOURS after your procedure.    When to call your healthcare provider  Call your cardiologist right away at [phone] if you have any of the following:   Problems/Concerns taking any of your prescribed heart medicines.   The insertion site has increasing pain, swelling, redness, bleeding, or drainage.   Your arm or leg below where the insertion site changes color, is cool, or is numb.   You have chest pain or shortness of breath that does not go away with rest.   Your pulse feels irregular -- very slow (less than 60 beats/minute) or very fast (over 100 beats/minute).   You have dizziness, fainting, or you are very tired.   You are coughing up blood or yellow or green mucus.   You have chills or a fever over 101°F (38.3°C).    If there is bleeding at the catheter insertion site, apply pressure for 10 minutes.  If bleeding persists, call 911, and continue to hold pressure until advanced medical support arrives.        Exercising Safely After Percutaneous Coronary Intervention (PCI)  After percutaneous coronary intervention (PCI), which involves angioplasty and often stenting, it's important to focus on your heart health. Exercise can help strengthen your heart. It can also help you feel good and improve your overall health. Talk with your health care provider or cardiac rehab team member about good options for you.  Start slowly. Work up to more vigorous exercise as you get stronger. Aim for at least 150 minutes of exercise each week.  Include aerobic activities. These make the heart beat faster. They work the heart and lungs, and improve the body's ability to use oxygen. Good choices include walking, swimming, and biking .  Always  follow your doctor's recommendation for exercise.   You have been referred to cardiac rehabilitation, which is important for your recovery.  You may contact [facility] at [phone] to learn more and schedule a visit.        Lifestyle Management After Percutaneous Coronary Intervention (PCI)  Percutaneous coronary intervention (PCI)  involves angioplasty and often stenting. This procedure can open arteries and relieve symptoms. But, it doesn't cure coronary artery disease. New blockages can still form. You need to take steps to prevent this by managing risk factors. Doing so will help make your heart and arteries healthier. Your doctor may prescribe cardiac rehabilitation to help with this lifelong process.  Understanding risk factors  Some risk factors for coronary artery disease can be controlled. These include smoking, high blood pressure, cholesterol, diabetes, and obesity. They can be managed with medication, diet, and exercise. Support and counseling can also play a role. The effort will pay off! Managing risk factors can help you be more active, feel better, and reduce the risk of heart attack.    If you smoke, quit!  If your doctor has been urging you to quit smoking, it's for good reasons. Smoking damages your heart, blood vessels, and lungs. The good news is that quitting can halt or even reverse the damage of smoking. To quit now:  Get medical help. Ask your doctor for advice on stop-smoking programs. Also ask about medications or nicotine replacement therapy products that may help you quit smoking.  Get support. Join a support group. Ask for help from your family and friends.  Don't give up. It often takes several tries to succeed in quitting smoking.  Avoid secondhand smoke. Ask family and friends not to smoke around you.

## 2024-06-12 ENCOUNTER — TELEPHONE (OUTPATIENT)
Dept: CARDIOLOGY | Facility: MEDICAL CENTER | Age: 69
End: 2024-06-12
Payer: MEDICARE

## 2024-06-12 ENCOUNTER — APPOINTMENT (OUTPATIENT)
Dept: ADMISSIONS | Facility: MEDICAL CENTER | Age: 69
DRG: 266 | End: 2024-06-12
Attending: INTERNAL MEDICINE
Payer: MEDICARE

## 2024-06-12 NOTE — TELEPHONE ENCOUNTER
Valve Conference Plan of Care: 6/12/2024 for TAVR 6/17/2024           TAVR Candidate: Yes  Access: left femoral  Valve Size: 26mm vs 29mm  General Anesthesia or MAC: GA  Unit: Telemetry  Incidental findings to be discussed with PCP and sent to Dr. Rafal Renee: Multiple sigmoid diverticula. Moderate degenerative change of the lumbar spine.   Clearance needed prior to procedure: No    Check in time of 0530 6/17/2024.     Pre-op appointment completed: No, to be completed 6/13/2024    NPO after midnight. Hold all vitamins and supplements 7 days prior, hold Metformin the morning of the procedure.

## 2024-06-12 NOTE — TELEPHONE ENCOUNTER
Thank you for the opportunity to participate in your patient's care.     Your patient is scheduled for transcatheter aortic valve replacement (TAVR) on Monday, June 17, 2024.    Sincerely,    [facility]    FIOR Encarnacion, RN, Structural Heart Program Nurse Coordinator ([phone])  FIOR Briceño, RN, Structural Heart Program Nurse Coordinator ([phone])

## 2024-06-12 NOTE — TELEPHONE ENCOUNTER
Received call back from patient.    Patient notified of procedure date/time and check in process.     All questions were answered. Patient has direct extension for any further questions/concerns prior to the procedure.

## 2024-06-13 ENCOUNTER — PRE-ADMISSION TESTING (OUTPATIENT)
Dept: ADMISSIONS | Facility: MEDICAL CENTER | Age: 69
DRG: 266 | End: 2024-06-13
Attending: INTERNAL MEDICINE
Payer: MEDICARE

## 2024-06-13 DIAGNOSIS — I35.0 NONRHEUMATIC AORTIC VALVE STENOSIS: ICD-10-CM

## 2024-06-16 ENCOUNTER — ANESTHESIA EVENT (OUTPATIENT)
Dept: SURGERY | Facility: MEDICAL CENTER | Age: 69
DRG: 266 | End: 2024-06-16
Payer: MEDICARE

## 2024-06-17 ENCOUNTER — HOSPITAL ENCOUNTER (INPATIENT)
Facility: MEDICAL CENTER | Age: 69
LOS: 1 days | DRG: 266 | End: 2024-06-18
Attending: INTERNAL MEDICINE | Admitting: INTERNAL MEDICINE
Payer: MEDICARE

## 2024-06-17 ENCOUNTER — APPOINTMENT (OUTPATIENT)
Dept: CARDIOLOGY | Facility: MEDICAL CENTER | Age: 69
DRG: 266 | End: 2024-06-17
Attending: STUDENT IN AN ORGANIZED HEALTH CARE EDUCATION/TRAINING PROGRAM
Payer: MEDICARE

## 2024-06-17 ENCOUNTER — APPOINTMENT (OUTPATIENT)
Dept: RADIOLOGY | Facility: MEDICAL CENTER | Age: 69
DRG: 266 | End: 2024-06-17
Payer: MEDICARE

## 2024-06-17 ENCOUNTER — ANESTHESIA (OUTPATIENT)
Dept: SURGERY | Facility: MEDICAL CENTER | Age: 69
DRG: 266 | End: 2024-06-17
Payer: MEDICARE

## 2024-06-17 DIAGNOSIS — Z95.2 S/P TAVR (TRANSCATHETER AORTIC VALVE REPLACEMENT): ICD-10-CM

## 2024-06-17 PROBLEM — I50.31 ACUTE DIASTOLIC HF (HEART FAILURE) (HCC): Status: ACTIVE | Noted: 2024-06-17

## 2024-06-17 LAB
ABO + RH BLD: NORMAL
ABO GROUP BLD: NORMAL
ACT BLD: 244 SEC (ref 74–137)
ALBUMIN SERPL BCP-MCNC: 3.5 G/DL (ref 3.2–4.9)
ALBUMIN/GLOB SERPL: 1.5 G/DL
ALP SERPL-CCNC: 51 U/L (ref 30–99)
ALT SERPL-CCNC: 27 U/L (ref 2–50)
ANION GAP SERPL CALC-SCNC: 7 MMOL/L (ref 7–16)
AST SERPL-CCNC: 41 U/L (ref 12–45)
BILIRUB SERPL-MCNC: 0.2 MG/DL (ref 0.1–1.5)
BLD GP AB SCN SERPL QL: NORMAL
BUN SERPL-MCNC: 14 MG/DL (ref 8–22)
CALCIUM ALBUM COR SERPL-MCNC: 8.4 MG/DL (ref 8.5–10.5)
CALCIUM SERPL-MCNC: 8 MG/DL (ref 8.5–10.5)
CHLORIDE SERPL-SCNC: 106 MMOL/L (ref 96–112)
CO2 SERPL-SCNC: 22 MMOL/L (ref 20–33)
CREAT SERPL-MCNC: 0.9 MG/DL (ref 0.5–1.4)
EKG IMPRESSION: NORMAL
ERYTHROCYTE [DISTWIDTH] IN BLOOD BY AUTOMATED COUNT: 45.2 FL (ref 35.9–50)
GFR SERPLBLD CREATININE-BSD FMLA CKD-EPI: 93 ML/MIN/1.73 M 2
GLOBULIN SER CALC-MCNC: 2.3 G/DL (ref 1.9–3.5)
GLUCOSE SERPL-MCNC: 114 MG/DL (ref 65–99)
HCT VFR BLD AUTO: 41 % (ref 42–52)
HGB BLD-MCNC: 13.9 G/DL (ref 14–18)
MCH RBC QN AUTO: 30.5 PG (ref 27–33)
MCHC RBC AUTO-ENTMCNC: 33.9 G/DL (ref 32.3–36.5)
MCV RBC AUTO: 90.1 FL (ref 81.4–97.8)
NT-PROBNP SERPL IA-MCNC: 496 PG/ML (ref 0–125)
PLATELET # BLD AUTO: 145 K/UL (ref 164–446)
PMV BLD AUTO: 12.7 FL (ref 9–12.9)
POTASSIUM SERPL-SCNC: 4.6 MMOL/L (ref 3.6–5.5)
PROT SERPL-MCNC: 5.8 G/DL (ref 6–8.2)
RBC # BLD AUTO: 4.55 M/UL (ref 4.7–6.1)
RH BLD: NORMAL
SODIUM SERPL-SCNC: 135 MMOL/L (ref 135–145)
WBC # BLD AUTO: 6.4 K/UL (ref 4.8–10.8)

## 2024-06-17 PROCEDURE — 83880 ASSAY OF NATRIURETIC PEPTIDE: CPT

## 2024-06-17 PROCEDURE — B245ZZ4 ULTRASONOGRAPHY OF LEFT HEART, TRANSESOPHAGEAL: ICD-10-PCS | Performed by: INTERNAL MEDICINE

## 2024-06-17 PROCEDURE — C1883 ADAPT/EXT, PACING/NEURO LEAD: HCPCS | Performed by: INTERNAL MEDICINE

## 2024-06-17 PROCEDURE — C1887 CATHETER, GUIDING: HCPCS | Performed by: INTERNAL MEDICINE

## 2024-06-17 PROCEDURE — 700102 HCHG RX REV CODE 250 W/ 637 OVERRIDE(OP): Performed by: STUDENT IN AN ORGANIZED HEALTH CARE EDUCATION/TRAINING PROGRAM

## 2024-06-17 PROCEDURE — 86901 BLOOD TYPING SEROLOGIC RH(D): CPT

## 2024-06-17 PROCEDURE — 503001 HCHG PERFUSION: Performed by: INTERNAL MEDICINE

## 2024-06-17 PROCEDURE — 33361 REPLACE AORTIC VALVE PERQ: CPT | Mod: 62,Q0 | Performed by: THORACIC SURGERY (CARDIOTHORACIC VASCULAR SURGERY)

## 2024-06-17 PROCEDURE — 700111 HCHG RX REV CODE 636 W/ 250 OVERRIDE (IP): Performed by: STUDENT IN AN ORGANIZED HEALTH CARE EDUCATION/TRAINING PROGRAM

## 2024-06-17 PROCEDURE — C1751 CATH, INF, PER/CENT/MIDLINE: HCPCS | Performed by: INTERNAL MEDICINE

## 2024-06-17 PROCEDURE — 700101 HCHG RX REV CODE 250: Performed by: STUDENT IN AN ORGANIZED HEALTH CARE EDUCATION/TRAINING PROGRAM

## 2024-06-17 PROCEDURE — 02RF38Z REPLACEMENT OF AORTIC VALVE WITH ZOOPLASTIC TISSUE, PERCUTANEOUS APPROACH: ICD-10-PCS | Performed by: INTERNAL MEDICINE

## 2024-06-17 PROCEDURE — 160002 HCHG RECOVERY MINUTES (STAT): Performed by: INTERNAL MEDICINE

## 2024-06-17 PROCEDURE — 33361 REPLACE AORTIC VALVE PERQ: CPT | Mod: 62,Q0 | Performed by: INTERNAL MEDICINE

## 2024-06-17 PROCEDURE — 700102 HCHG RX REV CODE 250 W/ 637 OVERRIDE(OP)

## 2024-06-17 PROCEDURE — A9270 NON-COVERED ITEM OR SERVICE: HCPCS

## 2024-06-17 PROCEDURE — 160042 HCHG SURGERY MINUTES - EA ADDL 1 MIN LEVEL 5: Performed by: INTERNAL MEDICINE

## 2024-06-17 PROCEDURE — 160035 HCHG PACU - 1ST 60 MINS PHASE I: Performed by: INTERNAL MEDICINE

## 2024-06-17 PROCEDURE — 700105 HCHG RX REV CODE 258: Performed by: INTERNAL MEDICINE

## 2024-06-17 PROCEDURE — 85027 COMPLETE CBC AUTOMATED: CPT

## 2024-06-17 PROCEDURE — 110372 HCHG SHELL REV 278: Performed by: INTERNAL MEDICINE

## 2024-06-17 PROCEDURE — 700111 HCHG RX REV CODE 636 W/ 250 OVERRIDE (IP): Mod: JZ

## 2024-06-17 PROCEDURE — C1725 CATH, TRANSLUMIN NON-LASER: HCPCS | Performed by: INTERNAL MEDICINE

## 2024-06-17 PROCEDURE — 36415 COLL VENOUS BLD VENIPUNCTURE: CPT

## 2024-06-17 PROCEDURE — A9270 NON-COVERED ITEM OR SERVICE: HCPCS | Performed by: STUDENT IN AN ORGANIZED HEALTH CARE EDUCATION/TRAINING PROGRAM

## 2024-06-17 PROCEDURE — 86900 BLOOD TYPING SEROLOGIC ABO: CPT

## 2024-06-17 PROCEDURE — 85347 COAGULATION TIME ACTIVATED: CPT

## 2024-06-17 PROCEDURE — 700105 HCHG RX REV CODE 258: Performed by: STUDENT IN AN ORGANIZED HEALTH CARE EDUCATION/TRAINING PROGRAM

## 2024-06-17 PROCEDURE — 93005 ELECTROCARDIOGRAM TRACING: CPT

## 2024-06-17 PROCEDURE — 80053 COMPREHEN METABOLIC PANEL: CPT

## 2024-06-17 PROCEDURE — 93355 ECHO TRANSESOPHAGEAL (TEE): CPT

## 2024-06-17 PROCEDURE — 700101 HCHG RX REV CODE 250: Performed by: INTERNAL MEDICINE

## 2024-06-17 PROCEDURE — 160048 HCHG OR STATISTICAL LEVEL 1-5: Performed by: INTERNAL MEDICINE

## 2024-06-17 PROCEDURE — 700111 HCHG RX REV CODE 636 W/ 250 OVERRIDE (IP): Mod: JG | Performed by: INTERNAL MEDICINE

## 2024-06-17 PROCEDURE — 93010 ELECTROCARDIOGRAM REPORT: CPT | Mod: 59 | Performed by: INTERNAL MEDICINE

## 2024-06-17 PROCEDURE — 770001 HCHG ROOM/CARE - MED/SURG/GYN PRIV*

## 2024-06-17 PROCEDURE — 700105 HCHG RX REV CODE 258

## 2024-06-17 PROCEDURE — C1894 INTRO/SHEATH, NON-LASER: HCPCS | Performed by: INTERNAL MEDICINE

## 2024-06-17 PROCEDURE — 71045 X-RAY EXAM CHEST 1 VIEW: CPT

## 2024-06-17 PROCEDURE — 86850 RBC ANTIBODY SCREEN: CPT

## 2024-06-17 PROCEDURE — 160036 HCHG PACU - EA ADDL 30 MINS PHASE I: Performed by: INTERNAL MEDICINE

## 2024-06-17 PROCEDURE — 160009 HCHG ANES TIME/MIN: Performed by: INTERNAL MEDICINE

## 2024-06-17 PROCEDURE — C1760 CLOSURE DEV, VASC: HCPCS | Performed by: INTERNAL MEDICINE

## 2024-06-17 PROCEDURE — C1769 GUIDE WIRE: HCPCS | Performed by: INTERNAL MEDICINE

## 2024-06-17 PROCEDURE — 160031 HCHG SURGERY MINUTES - 1ST 30 MINS LEVEL 5: Performed by: INTERNAL MEDICINE

## 2024-06-17 DEVICE — IMPLANTABLE DEVICE: Type: IMPLANTABLE DEVICE | Site: GROIN | Status: FUNCTIONAL

## 2024-06-17 RX ORDER — OXYCODONE HCL 5 MG/5 ML
10 SOLUTION, ORAL ORAL
Status: DISCONTINUED | OUTPATIENT
Start: 2024-06-17 | End: 2024-06-17 | Stop reason: HOSPADM

## 2024-06-17 RX ORDER — BISACODYL 10 MG
10 SUPPOSITORY, RECTAL RECTAL
Status: DISCONTINUED | OUTPATIENT
Start: 2024-06-17 | End: 2024-06-18 | Stop reason: HOSPADM

## 2024-06-17 RX ORDER — ONDANSETRON 2 MG/ML
4 INJECTION INTRAMUSCULAR; INTRAVENOUS EVERY 4 HOURS PRN
Status: DISCONTINUED | OUTPATIENT
Start: 2024-06-17 | End: 2024-06-18 | Stop reason: HOSPADM

## 2024-06-17 RX ORDER — EPHEDRINE SULFATE 50 MG/ML
5 INJECTION, SOLUTION INTRAVENOUS
Status: DISCONTINUED | OUTPATIENT
Start: 2024-06-17 | End: 2024-06-17 | Stop reason: HOSPADM

## 2024-06-17 RX ORDER — POTASSIUM CHLORIDE 20 MEQ/1
20 TABLET, EXTENDED RELEASE ORAL DAILY
Status: DISCONTINUED | OUTPATIENT
Start: 2024-06-17 | End: 2024-06-18 | Stop reason: HOSPADM

## 2024-06-17 RX ORDER — AMOXICILLIN 250 MG
1 CAPSULE ORAL
Status: DISCONTINUED | OUTPATIENT
Start: 2024-06-17 | End: 2024-06-18 | Stop reason: HOSPADM

## 2024-06-17 RX ORDER — DEXAMETHASONE SODIUM PHOSPHATE 4 MG/ML
INJECTION, SOLUTION INTRA-ARTICULAR; INTRALESIONAL; INTRAMUSCULAR; INTRAVENOUS; SOFT TISSUE PRN
Status: DISCONTINUED | OUTPATIENT
Start: 2024-06-17 | End: 2024-06-17 | Stop reason: SURG

## 2024-06-17 RX ORDER — FUROSEMIDE 10 MG/ML
20 INJECTION INTRAMUSCULAR; INTRAVENOUS
Status: DISCONTINUED | OUTPATIENT
Start: 2024-06-17 | End: 2024-06-18 | Stop reason: HOSPADM

## 2024-06-17 RX ORDER — LIDOCAINE HYDROCHLORIDE 20 MG/ML
INJECTION, SOLUTION INFILTRATION; PERINEURAL
Status: DISCONTINUED | OUTPATIENT
Start: 2024-06-17 | End: 2024-06-17 | Stop reason: HOSPADM

## 2024-06-17 RX ORDER — HYDRALAZINE HYDROCHLORIDE 20 MG/ML
10 INJECTION INTRAMUSCULAR; INTRAVENOUS
Status: DISCONTINUED | OUTPATIENT
Start: 2024-06-17 | End: 2024-06-18 | Stop reason: HOSPADM

## 2024-06-17 RX ORDER — ONDANSETRON 2 MG/ML
INJECTION INTRAMUSCULAR; INTRAVENOUS PRN
Status: DISCONTINUED | OUTPATIENT
Start: 2024-06-17 | End: 2024-06-17 | Stop reason: SURG

## 2024-06-17 RX ORDER — DIPHENHYDRAMINE HYDROCHLORIDE 50 MG/ML
12.5 INJECTION INTRAMUSCULAR; INTRAVENOUS
Status: DISCONTINUED | OUTPATIENT
Start: 2024-06-17 | End: 2024-06-17 | Stop reason: HOSPADM

## 2024-06-17 RX ORDER — VERAPAMIL HYDROCHLORIDE 2.5 MG/ML
INJECTION, SOLUTION INTRAVENOUS
Status: DISCONTINUED | OUTPATIENT
Start: 2024-06-17 | End: 2024-06-17 | Stop reason: HOSPADM

## 2024-06-17 RX ORDER — OXYCODONE HCL 5 MG/5 ML
5 SOLUTION, ORAL ORAL
Status: DISCONTINUED | OUTPATIENT
Start: 2024-06-17 | End: 2024-06-17 | Stop reason: HOSPADM

## 2024-06-17 RX ORDER — HALOPERIDOL 5 MG/ML
1 INJECTION INTRAMUSCULAR
Status: DISCONTINUED | OUTPATIENT
Start: 2024-06-17 | End: 2024-06-17 | Stop reason: HOSPADM

## 2024-06-17 RX ORDER — SODIUM CHLORIDE, SODIUM LACTATE, POTASSIUM CHLORIDE, CALCIUM CHLORIDE 600; 310; 30; 20 MG/100ML; MG/100ML; MG/100ML; MG/100ML
INJECTION, SOLUTION INTRAVENOUS CONTINUOUS
Status: DISCONTINUED | OUTPATIENT
Start: 2024-06-17 | End: 2024-06-17 | Stop reason: HOSPADM

## 2024-06-17 RX ORDER — PROTAMINE SULFATE 10 MG/ML
INJECTION, SOLUTION INTRAVENOUS PRN
Status: DISCONTINUED | OUTPATIENT
Start: 2024-06-17 | End: 2024-06-17 | Stop reason: SURG

## 2024-06-17 RX ORDER — CEFAZOLIN SODIUM 1 G/3ML
INJECTION, POWDER, FOR SOLUTION INTRAMUSCULAR; INTRAVENOUS PRN
Status: DISCONTINUED | OUTPATIENT
Start: 2024-06-17 | End: 2024-06-17 | Stop reason: SURG

## 2024-06-17 RX ORDER — ASPIRIN 81 MG/1
81 TABLET ORAL DAILY
Status: DISCONTINUED | OUTPATIENT
Start: 2024-06-17 | End: 2024-06-18 | Stop reason: HOSPADM

## 2024-06-17 RX ORDER — ENEMA 19; 7 G/133ML; G/133ML
1 ENEMA RECTAL
Status: DISCONTINUED | OUTPATIENT
Start: 2024-06-17 | End: 2024-06-18 | Stop reason: HOSPADM

## 2024-06-17 RX ORDER — ONDANSETRON 2 MG/ML
4 INJECTION INTRAMUSCULAR; INTRAVENOUS
Status: DISCONTINUED | OUTPATIENT
Start: 2024-06-17 | End: 2024-06-17 | Stop reason: HOSPADM

## 2024-06-17 RX ORDER — BUPIVACAINE HYDROCHLORIDE 2.5 MG/ML
INJECTION, SOLUTION EPIDURAL; INFILTRATION; INTRACAUDAL
Status: DISCONTINUED | OUTPATIENT
Start: 2024-06-17 | End: 2024-06-17 | Stop reason: HOSPADM

## 2024-06-17 RX ORDER — ROCURONIUM BROMIDE 10 MG/ML
INJECTION, SOLUTION INTRAVENOUS PRN
Status: DISCONTINUED | OUTPATIENT
Start: 2024-06-17 | End: 2024-06-17 | Stop reason: SURG

## 2024-06-17 RX ORDER — AMOXICILLIN 250 MG
1 CAPSULE ORAL NIGHTLY
Status: DISCONTINUED | OUTPATIENT
Start: 2024-06-17 | End: 2024-06-18 | Stop reason: HOSPADM

## 2024-06-17 RX ORDER — LEVOTHYROXINE SODIUM 0.12 MG/1
125 TABLET ORAL DAILY
Status: DISCONTINUED | OUTPATIENT
Start: 2024-06-17 | End: 2024-06-18 | Stop reason: HOSPADM

## 2024-06-17 RX ORDER — POLYETHYLENE GLYCOL 3350 17 G/17G
1 POWDER, FOR SOLUTION ORAL 2 TIMES DAILY PRN
Status: DISCONTINUED | OUTPATIENT
Start: 2024-06-17 | End: 2024-06-18 | Stop reason: HOSPADM

## 2024-06-17 RX ORDER — HYDRALAZINE HYDROCHLORIDE 20 MG/ML
5 INJECTION INTRAMUSCULAR; INTRAVENOUS
Status: DISCONTINUED | OUTPATIENT
Start: 2024-06-17 | End: 2024-06-17 | Stop reason: HOSPADM

## 2024-06-17 RX ORDER — DIPHENHYDRAMINE HCL 25 MG
25 TABLET ORAL NIGHTLY PRN
Status: DISCONTINUED | OUTPATIENT
Start: 2024-06-17 | End: 2024-06-18 | Stop reason: HOSPADM

## 2024-06-17 RX ORDER — DIPHENHYDRAMINE HYDROCHLORIDE 50 MG/ML
25 INJECTION INTRAMUSCULAR; INTRAVENOUS EVERY 6 HOURS PRN
Status: DISCONTINUED | OUTPATIENT
Start: 2024-06-17 | End: 2024-06-18 | Stop reason: HOSPADM

## 2024-06-17 RX ORDER — HEPARIN SODIUM 1000 [USP'U]/ML
INJECTION, SOLUTION INTRAVENOUS; SUBCUTANEOUS PRN
Status: DISCONTINUED | OUTPATIENT
Start: 2024-06-17 | End: 2024-06-17 | Stop reason: SURG

## 2024-06-17 RX ORDER — SODIUM CHLORIDE, SODIUM GLUCONATE, SODIUM ACETATE, POTASSIUM CHLORIDE AND MAGNESIUM CHLORIDE 526; 502; 368; 37; 30 MG/100ML; MG/100ML; MG/100ML; MG/100ML; MG/100ML
INJECTION, SOLUTION INTRAVENOUS
Status: DISCONTINUED | OUTPATIENT
Start: 2024-06-17 | End: 2024-06-17 | Stop reason: SURG

## 2024-06-17 RX ORDER — LIDOCAINE HYDROCHLORIDE 40 MG/ML
SOLUTION TOPICAL PRN
Status: DISCONTINUED | OUTPATIENT
Start: 2024-06-17 | End: 2024-06-17 | Stop reason: SURG

## 2024-06-17 RX ORDER — ACETAMINOPHEN 325 MG/1
650 TABLET ORAL EVERY 6 HOURS PRN
Status: DISCONTINUED | OUTPATIENT
Start: 2024-06-17 | End: 2024-06-18 | Stop reason: HOSPADM

## 2024-06-17 RX ORDER — SODIUM CHLORIDE 9 MG/ML
INJECTION, SOLUTION INTRAVENOUS CONTINUOUS
Status: ACTIVE | OUTPATIENT
Start: 2024-06-17 | End: 2024-06-17

## 2024-06-17 RX ORDER — LIDOCAINE HYDROCHLORIDE 20 MG/ML
INJECTION, SOLUTION EPIDURAL; INFILTRATION; INTRACAUDAL; PERINEURAL PRN
Status: DISCONTINUED | OUTPATIENT
Start: 2024-06-17 | End: 2024-06-17 | Stop reason: SURG

## 2024-06-17 RX ORDER — ACETAMINOPHEN 500 MG
1000 TABLET ORAL ONCE
Status: COMPLETED | OUTPATIENT
Start: 2024-06-17 | End: 2024-06-17

## 2024-06-17 RX ORDER — DOCUSATE SODIUM 100 MG/1
100 CAPSULE, LIQUID FILLED ORAL 2 TIMES DAILY
Status: DISCONTINUED | OUTPATIENT
Start: 2024-06-17 | End: 2024-06-18 | Stop reason: HOSPADM

## 2024-06-17 RX ADMIN — FUROSEMIDE 20 MG: 10 INJECTION, SOLUTION INTRAVENOUS at 12:55

## 2024-06-17 RX ADMIN — SUGAMMADEX 200 MG: 100 INJECTION, SOLUTION INTRAVENOUS at 08:43

## 2024-06-17 RX ADMIN — HEPARIN SODIUM 8000 UNITS: 1000 INJECTION, SOLUTION INTRAVENOUS; SUBCUTANEOUS at 08:03

## 2024-06-17 RX ADMIN — ONDANSETRON 4 MG: 2 INJECTION INTRAMUSCULAR; INTRAVENOUS at 07:52

## 2024-06-17 RX ADMIN — LEVOTHYROXINE SODIUM 125 MCG: 0.12 TABLET ORAL at 12:55

## 2024-06-17 RX ADMIN — HEPARIN SODIUM 2000 UNITS: 1000 INJECTION, SOLUTION INTRAVENOUS; SUBCUTANEOUS at 08:13

## 2024-06-17 RX ADMIN — FENTANYL CITRATE 50 MCG: 50 INJECTION, SOLUTION INTRAMUSCULAR; INTRAVENOUS at 07:35

## 2024-06-17 RX ADMIN — SODIUM CHLORIDE, SODIUM GLUCONATE, SODIUM ACETATE, POTASSIUM CHLORIDE AND MAGNESIUM CHLORIDE: 526; 502; 368; 37; 30 INJECTION, SOLUTION INTRAVENOUS at 07:30

## 2024-06-17 RX ADMIN — PROPOFOL 150 MG: 10 INJECTION, EMULSION INTRAVENOUS at 07:43

## 2024-06-17 RX ADMIN — PROTAMINE SULFATE 50 MG: 10 INJECTION, SOLUTION INTRAVENOUS at 08:38

## 2024-06-17 RX ADMIN — ACETAMINOPHEN 1000 MG: 500 TABLET, FILM COATED ORAL at 07:12

## 2024-06-17 RX ADMIN — CEFAZOLIN 2 G: 1 INJECTION, POWDER, FOR SOLUTION INTRAMUSCULAR; INTRAVENOUS at 07:43

## 2024-06-17 RX ADMIN — FENTANYL CITRATE 50 MCG: 50 INJECTION, SOLUTION INTRAMUSCULAR; INTRAVENOUS at 08:43

## 2024-06-17 RX ADMIN — LIDOCAINE HYDROCHLORIDE 80 MG: 20 INJECTION, SOLUTION EPIDURAL; INFILTRATION; INTRACAUDAL at 07:43

## 2024-06-17 RX ADMIN — DEXAMETHASONE SODIUM PHOSPHATE 4 MG: 4 INJECTION INTRA-ARTICULAR; INTRALESIONAL; INTRAMUSCULAR; INTRAVENOUS; SOFT TISSUE at 07:52

## 2024-06-17 RX ADMIN — LIDOCAINE HYDROCHLORIDE 4 ML: 40 SOLUTION TOPICAL at 07:44

## 2024-06-17 RX ADMIN — ROCURONIUM BROMIDE 70 MG: 50 INJECTION, SOLUTION INTRAVENOUS at 07:44

## 2024-06-17 RX ADMIN — ASPIRIN 81 MG: 81 TABLET, COATED ORAL at 12:55

## 2024-06-17 RX ADMIN — SODIUM CHLORIDE: 9 INJECTION, SOLUTION INTRAVENOUS at 10:45

## 2024-06-17 RX ADMIN — POTASSIUM CHLORIDE 20 MEQ: 1500 TABLET, EXTENDED RELEASE ORAL at 12:55

## 2024-06-17 SDOH — ECONOMIC STABILITY: TRANSPORTATION INSECURITY
IN THE PAST 12 MONTHS, HAS LACK OF RELIABLE TRANSPORTATION KEPT YOU FROM MEDICAL APPOINTMENTS, MEETINGS, WORK OR FROM GETTING THINGS NEEDED FOR DAILY LIVING?: NO

## 2024-06-17 SDOH — ECONOMIC STABILITY: TRANSPORTATION INSECURITY
IN THE PAST 12 MONTHS, HAS THE LACK OF TRANSPORTATION KEPT YOU FROM MEDICAL APPOINTMENTS OR FROM GETTING MEDICATIONS?: NO

## 2024-06-17 ASSESSMENT — SOCIAL DETERMINANTS OF HEALTH (SDOH)
WITHIN THE LAST YEAR, HAVE TO BEEN RAPED OR FORCED TO HAVE ANY KIND OF SEXUAL ACTIVITY BY YOUR PARTNER OR EX-PARTNER?: NO
WITHIN THE PAST 12 MONTHS, YOU WORRIED THAT YOUR FOOD WOULD RUN OUT BEFORE YOU GOT THE MONEY TO BUY MORE: PATIENT DECLINED
WITHIN THE LAST YEAR, HAVE YOU BEEN AFRAID OF YOUR PARTNER OR EX-PARTNER?: NO
WITHIN THE LAST YEAR, HAVE YOU BEEN KICKED, HIT, SLAPPED, OR OTHERWISE PHYSICALLY HURT BY YOUR PARTNER OR EX-PARTNER?: NO
IN THE PAST 12 MONTHS, HAS THE ELECTRIC, GAS, OIL, OR WATER COMPANY THREATENED TO SHUT OFF SERVICE IN YOUR HOME?: PATIENT DECLINED
WITHIN THE PAST 12 MONTHS, THE FOOD YOU BOUGHT JUST DIDN'T LAST AND YOU DIDN'T HAVE MONEY TO GET MORE: PATIENT DECLINED
WITHIN THE LAST YEAR, HAVE YOU BEEN HUMILIATED OR EMOTIONALLY ABUSED IN OTHER WAYS BY YOUR PARTNER OR EX-PARTNER?: NO

## 2024-06-17 ASSESSMENT — LIFESTYLE VARIABLES
ON A TYPICAL DAY WHEN YOU DRINK ALCOHOL HOW MANY DRINKS DO YOU HAVE: 1
HAVE YOU EVER FELT YOU SHOULD CUT DOWN ON YOUR DRINKING: NO
CONSUMPTION TOTAL: NEGATIVE
TOTAL SCORE: 0
TOTAL SCORE: 0
EVER HAD A DRINK FIRST THING IN THE MORNING TO STEADY YOUR NERVES TO GET RID OF A HANGOVER: NO
TOTAL SCORE: 0
DOES PATIENT WANT TO STOP DRINKING: NO
AVERAGE NUMBER OF DAYS PER WEEK YOU HAVE A DRINK CONTAINING ALCOHOL: 7
HAVE PEOPLE ANNOYED YOU BY CRITICIZING YOUR DRINKING: NO
ALCOHOL_USE: YES
EVER FELT BAD OR GUILTY ABOUT YOUR DRINKING: NO
HOW MANY TIMES IN THE PAST YEAR HAVE YOU HAD 5 OR MORE DRINKS IN A DAY: 0

## 2024-06-17 ASSESSMENT — COGNITIVE AND FUNCTIONAL STATUS - GENERAL
DAILY ACTIVITIY SCORE: 24
MOBILITY SCORE: 24
SUGGESTED CMS G CODE MODIFIER MOBILITY: CH
SUGGESTED CMS G CODE MODIFIER DAILY ACTIVITY: CH

## 2024-06-17 ASSESSMENT — PAIN SCALES - GENERAL: PAIN_LEVEL: 0

## 2024-06-17 ASSESSMENT — FIBROSIS 4 INDEX
FIB4 SCORE: 1.79
FIB4 SCORE: 3.65

## 2024-06-17 ASSESSMENT — PATIENT HEALTH QUESTIONNAIRE - PHQ9
SUM OF ALL RESPONSES TO PHQ9 QUESTIONS 1 AND 2: 0
2. FEELING DOWN, DEPRESSED, IRRITABLE, OR HOPELESS: NOT AT ALL
1. LITTLE INTEREST OR PLEASURE IN DOING THINGS: NOT AT ALL

## 2024-06-17 ASSESSMENT — PAIN DESCRIPTION - PAIN TYPE: TYPE: ACUTE PAIN

## 2024-06-17 NOTE — ANESTHESIA PROCEDURE NOTES
Airway    Date/Time: 6/17/2024 7:44 AM    Performed by: Tash Kaiser M.D.  Authorized by: Tash Kaiser M.D.    Location:  OR  Urgency:  Elective  Indications for Airway Management:  Anesthesia      Spontaneous Ventilation: absent    Sedation Level:  Deep  Preoxygenated: Yes    Patient Position:  Sniffing  Mask Difficulty Assessment:  1 - vent by mask  Final Airway Type:  Endotracheal airway  Final Endotracheal Airway:  ETT  Cuffed: Yes    Technique Used for Successful ETT Placement:  Direct laryngoscopy    Insertion Site:  Oral  Blade Type:  Amanda  Laryngoscope Blade/Videolaryngoscope Blade Size:  4  ETT Size (mm):  7.5  Measured from:  Teeth  ETT to Teeth (cm):  25  Placement Verified by: auscultation and capnometry    Cormack-Lehane Classification:  Grade IIb - view of arytenoids or posterior of glottis only  Number of Attempts at Approach:  1

## 2024-06-17 NOTE — ANESTHESIA PROCEDURE NOTES
Arterial Line    Performed by: Tash Kaiser M.D.  Authorized by: Tash Kaiser M.D.    Start Time:  6/17/2024 7:37 AM  End Time:  6/17/2024 7:42 AM  Localization: ultrasound guidance  Image captured, interpreted and electronically stored.  Patient Location:  OR  Indication: continuous blood pressure monitoring        Catheter Size:  20 G  Seldinger Technique?: Yes    Laterality:  Left  Site:  Radial artery  Line Secured:  Antimicrobial disc, tape and transparent dressing  Events: patient tolerated procedure well with no complications

## 2024-06-17 NOTE — ANESTHESIA PREPROCEDURE EVALUATION
Case: [id number] Date/Time: 06/17/24 0715    Procedures:       TRANSCATHETER AORTIC VALVE REPLACEMENT WITH POTENTIAL TRANSESOPHAGEAL ECHOCARDIOGRAM      ECHOCARDIOGRAM, TRANSESOPHAGEAL, INTRAOPERATIVE    Pre-op diagnosis: SEVERE AORTIC STENOSIS    Location: [facility] / SURGERY [facility]    Surgeons: Scotty De Dios M.D.          TTE 5/23/24  CONCLUSIONS  Compared to the images of the prior study on 12/13/2022 - aortic   stenosis is now severe, previously moderate.  Normal left ventricular systolic function.  The left ventricular ejection fraction is visually estimated to be 60%.  Normal diastolic function.  The right ventricle is normal in size and systolic function.  Severe aortic valve stenosis.  Mild aortic insufficiency.    Cardiac cath 6/11/24: No CAD    Pre TAVR CAT scan 6/5/24  Tricuspid aortic valve with moderate calcifications.  Annulus area: 546 mm?  Annulus diameter: 24.8 x 29.7 mm  Diameter at the level of the sinuses: 31.2 x 34.5 x 35.6 mm      Relevant Problems   ANESTHESIA   (positive) Obstructive sleep apnea syndrome      CARDIAC   (positive) Severe aortic stenosis      GI   (positive) Gastroesophageal reflux disease without esophagitis      ENDO   (positive) Postoperative hypothyroidism   (positive) Type 2 diabetes mellitus with hyperosmolarity without coma, without long-term current use of insulin (HCC)       Physical Exam    Airway   Mallampati: II  TM distance: >3 FB  Neck ROM: full       Cardiovascular - normal exam  Rhythm: regular  Rate: normal  (+) murmur     Dental - normal exam           Pulmonary - normal exam  Breath sounds clear to auscultation     Abdominal    Neurological - normal exam         Other findings: No loose teeth               Anesthesia Plan    ASA 4       Plan - general       Airway plan will be ETT  SHAHLA Planned  (ASA 4 for severe valvular disease)      Induction: intravenous    Postoperative Plan: Postoperative administration of opioids is intended.    Pertinent  diagnostic labs and testing reviewed    Informed Consent:    Anesthetic plan and risks discussed with patient and spouse.    Use of blood products discussed with: patient whom consented to blood products.

## 2024-06-17 NOTE — ANESTHESIA POSTPROCEDURE EVALUATION
Patient: Venkat Avitia    Procedure Summary       Date: 06/17/24 Room / Location: [place] [id number] / SURGERY [facility]    Anesthesia Start: 0730 Anesthesia Stop: 0854    Procedures:       TRANSCATHETER AORTIC VALVE REPLACEMENT (Bilateral: Groin)      ECHOCARDIOGRAM, TRANSESOPHAGEAL, INTRAOPERATIVE (Mouth) Diagnosis: (SEVERE AORTIC STENOSIS)    Surgeons: Scotty De Dios M.D. Responsible Provider: Tash Kaiser M.D.    Anesthesia Type: general ASA Status: 4            Final Anesthesia Type: general  Last vitals  BP   Blood Pressure : 131/69    Temp   36.1 °C (97 °F)    Pulse   65   Resp   18    SpO2   95 %      Anesthesia Post Evaluation    Patient location during evaluation: PACU  Patient participation: complete - patient participated  Level of consciousness: awake  Pain score: 0    Airway patency: patent  Anesthetic complications: no  Cardiovascular status: hemodynamically stable  Respiratory status: acceptable and face mask  Hydration status: euvolemic    PONV: none          No notable events documented.     Nurse Pain Score: 0 (NPRS)

## 2024-06-17 NOTE — ANESTHESIA TIME REPORT
Anesthesia Start and Stop Event Times       Date Time Event    6/17/2024 0708 Ready for Procedure     0730 Anesthesia Start     0854 Anesthesia Stop          Responsible Staff  06/17/24      Name Role Begin End    Tash Kaiser M.D. Anesth 0730 0854          Overtime Reason:  no overtime (within assigned shift)    Comments:

## 2024-06-17 NOTE — ANESTHESIA PROCEDURE NOTES
SHAHLA    Date/Time: 6/17/2024 7:47 AM    Performed by: Tash Kaiser M.D.  Authorized by: Tash Kaiser M.D.    Start Time:6/17/2024 7:47 AM  Preanesthetic Checklist: patient identified, IV checked, site marked, risks and benefits discussed, surgical consent, monitors and equipment checked, pre-op evaluation and timeout performed    Indication for SHAHLA: diagnostic   Patient Location: OR  Intubated: Yes  Bite Block: Yes  Heart Visualized: Yes  Insertion: atraumatic    **See FULL SHAHLA report in patient's chart via CV Synapse**

## 2024-06-17 NOTE — OP REPORT
Full Operative Report  Date of Service: 06/17/24    PreOp Diagnosis: severe symptomatic aortic stenosis, LUIS, obesity, DM2, GERD, hypothyroidism, acute on chronic CHF      PostOp Diagnosis: same      Procedure(s):  TRANSCATHETER AORTIC VALVE REPLACEMENT - Wound Class: Clean  ECHOCARDIOGRAM, TRANSESOPHAGEAL, INTRAOPERATIVE - Wound Class: None    Surgeon(s):  GRACE Figueroa M.D.    Anesthesiologist/Type of Anesthesia:  Anesthesiologist: Tash Kaiser M.D.  Perfusionist: Dolly Malcolm/General    Surgical Staff:  Circulator: Gilles Guillaume R.N.; Yuliya Bailey R.N.  Scrub Person: Velvet Peña  Radiology Technologist: Grisell de La Rosa Marquez; Darwin Jacobs    Specimens removed if any:  * No specimens in log *    Estimated Blood Loss: minimal    Findings: trivial perivalv leak, no significant residual gradient    Complications: none    Details:  The patient was brought to the operating room placed on the operating room table in the supine position.  After successful induction of general anesthesia endotracheal intubation, the patient was prepped and draped in the usual sterile fashion.  A right femoral venous access was obtained and a temporary transvenous pacemaker was placed in the right ventricle.  Additionally, ultrasound-guided right radial arterial and left femoral arterial access was obtained.  A pigtail catheter was placed in the right coronary sinus.  The deployment angle was determined.  A 1 cm incision was made the right groin and 2 Perclose sutures were deployed.  A 14 Lebanese eSheath was then placed in the right common femoral artery and its tip was positioned in the abdominal aorta.  The aortic valve was crossed with a wire. Pre-valve balloon dilatation was  performed. A deployment catheter with a 26 mm S3 Tim pericardial valve at its tip was positioned across the aortic valve annulus.  The implantation balloon was inflated to a peak pressure of 7 serena at  nominal volume injected.  The balloon was deflated and the deployment catheter was pulled back.  Intraoperative transesophageal echocardiography showed mild paravalvular leak. The valve was recrossed with balloon and it was inflated to 7.5 serena with nominal +1cc volume injected. At this point the leak was trivial.  Wires and catheters were removed.   The remainder of the operation will be dictated by Dr. De Dios.  There were no apparent complications.  The patient tolerated the procedure well and left the operating room in stable condition.    6/17/2024 8:49 AM Rossana Bernstein M.D.

## 2024-06-17 NOTE — PROGRESS NOTES
Report called to floor. Patient to floor with RN on monitor in stable condition. VSS. Cath sites clean dry  soft x3. Aox4 and on RA. Belongings returned. Family updated. No further needs.

## 2024-06-17 NOTE — PROGRESS NOTES
Handoff report received from Thierry DALE. Patient is AAOx4, on RA, on tele monitor, and VSS. Patient bilateral groin sited CDI and soft. Patient updated on POC, all questions answered. Patient resting in bed with call light in reach.

## 2024-06-17 NOTE — OP REPORT
"TRANSCATHETER AORTIC VALVE REPLACEMENT REPORT    Referring Provider: Dr. Renee    INTERVENTIONAL CARDIOLOGIST: Scotty De Dios MD  CARDIAC SURGEON: Rossana Bernstein MD  ANESTHESIOLOGIST: Tash Kaiser MD    ASSISTANT: None    IMPRESSIONS:  1. Successful implantation of a 26 Tim Hortencia S3 Ultra Resilia deployed at nominal volume +1 cc via the transfemoral approach  2. Acute decompensated diastolic heart failure with LVEDP of 27 mmHg    Recommendations:  4 hour bedrest  Aspirin 81 mg daily    Pre-procedure diagnosis: TAVR recommended by heart valve team. Stage D1 (symptomatic severe AS)  Post-procedure diagnosis: Same    Procedure performed  Pigtail placement/ascending aortography  Transvenous pacemaker  26 Tim S3 Ultra Resilia  Perclose closure    Procedure Description  1. Access:   A) Valve Sheath: Left femoral, 16 Tim eSheath. Fluoroscopic guidance was utilized for femoral access Dynamic ultrasound was utilized to gain access.  B) Temporary pacemaker: 6 Ethiopian Right femoral vein. Fluoroscopic guidance was utilized for femoral access Dynamic ultrasound was utilized to gain access.  C) Pigtail catheter: 5/6 Ethiopian right radial artery Micropuncture technique was utilized following local anesthesia with lidocaine.  Fluoroscopic guidance was utilized for femoral access    2. Procedure Description:  A TVP and pigtail catheter were placed and appropriate pacer function and implantation angle confirmed. The preclose was deployed followed by dilation of the tract with an 8F sheath. A standard 0.035\" J wire was used to deliver an catheter to the ascending aorta and then exchange for a 0.035\" Lunderquist wire. Over this wire the Tim E sheath advanced into the descending aorta. An AL1 was placed in the ascending aorta and the valve crossed with a 0.035\" strait wire. The catheter was advanced into the LV apex and wire exchanged for a 0.035\" Amplatz Super Stiff wire.  Balloon aortic valvuloplasty was performed " with a 25 mm balloon. Next a 26 mm Tim Hortencia S3 Ultra Resilia was deployed under rapid pacing with nominal volume  and 7 serena. Echocardiogram showed unacceptable paravalvular leak and thus the valve was re-crossed with the deployment balloon and an additional 1 cc were added followed by repeat valvuloplasty during rapid pacing. . Successful valve implantation confirmed by SHAHLA - PVL reduced to trace/mild. A pigtail catheter was used to perform left heart catheterization and LVEDP measured at 27 mmHg.  Protamine was administered and the Tim sheath was removed from the body after reinsertion of the dilator. The perclose sutures were tightened hemostasis was achieved. The pigtail catheter was removed. The TVP was removed.    3. Hemostasis:   A) TAVR Sheath: Perclose by Preclose technique  B) TVP: Manual  C) Pigtail access: TR band    Technical Factors  1. Complications: None  2. Estimated Blood Loss: 50 cc  3. Specimens: None  4. Contrast Volume: 140 ml  5. Sedation: General Anesthesia  6. Echo: SHAHLA

## 2024-06-17 NOTE — CARE PLAN
The patient is Watcher - Medium risk of patient condition declining or worsening    Shift Goals  Clinical Goals: sit in chair, ambulate, neuro and pulse checks  Patient Goals: rest, get TR band off    Progress made toward(s) clinical / shift goals:      Problem: Pain - Standard  Goal: Alleviation of pain or a reduction in pain to the patient’s comfort goal  Outcome: Progressing     Problem: Knowledge Deficit - Standard  Goal: Patient and family/care givers will demonstrate understanding of plan of care, disease process/condition, diagnostic tests and medications  Outcome: Progressing     Problem: Respiratory  Goal: Patient will achieve/maintain optimum respiratory ventilation and gas exchange  Outcome: Progressing     Problem: Urinary - Renal Perfusion  Goal: Ability to achieve and maintain adequate renal perfusion and functioning will improve  Outcome: Progressing     Problem: Urinary Elimination  Goal: Establish and maintain regular urinary output  Outcome: Progressing     Patient has history of BPH and tips procedure. Patient having issues voiding, attempted to straight cath 2 times and failed. Patient finally voided after patient attempted to go to bathroom a few more times, voided 270 and had 130 post void.   Patient is not progressing towards the following goals:

## 2024-06-18 ENCOUNTER — APPOINTMENT (OUTPATIENT)
Dept: RADIOLOGY | Facility: MEDICAL CENTER | Age: 69
DRG: 266 | End: 2024-06-18
Payer: MEDICARE

## 2024-06-18 ENCOUNTER — APPOINTMENT (OUTPATIENT)
Dept: CARDIOLOGY | Facility: MEDICAL CENTER | Age: 69
DRG: 266 | End: 2024-06-18
Payer: MEDICARE

## 2024-06-18 VITALS
TEMPERATURE: 97.9 F | HEART RATE: 65 BPM | HEIGHT: 72 IN | OXYGEN SATURATION: 95 % | DIASTOLIC BLOOD PRESSURE: 68 MMHG | SYSTOLIC BLOOD PRESSURE: 116 MMHG | RESPIRATION RATE: 18 BRPM | BODY MASS INDEX: 28.04 KG/M2 | WEIGHT: 207.01 LBS

## 2024-06-18 PROBLEM — I35.0 SEVERE AORTIC STENOSIS: Status: RESOLVED | Noted: 2019-10-14 | Resolved: 2024-06-18

## 2024-06-18 LAB
ALBUMIN SERPL BCP-MCNC: 3.7 G/DL (ref 3.2–4.9)
ALBUMIN/GLOB SERPL: 1.4 G/DL
ALP SERPL-CCNC: 57 U/L (ref 30–99)
ALT SERPL-CCNC: 28 U/L (ref 2–50)
ANION GAP SERPL CALC-SCNC: 11 MMOL/L (ref 7–16)
AST SERPL-CCNC: 34 U/L (ref 12–45)
BILIRUB SERPL-MCNC: 0.4 MG/DL (ref 0.1–1.5)
BUN SERPL-MCNC: 15 MG/DL (ref 8–22)
CALCIUM ALBUM COR SERPL-MCNC: 9.2 MG/DL (ref 8.5–10.5)
CALCIUM SERPL-MCNC: 9 MG/DL (ref 8.5–10.5)
CHLORIDE SERPL-SCNC: 105 MMOL/L (ref 96–112)
CO2 SERPL-SCNC: 23 MMOL/L (ref 20–33)
CREAT SERPL-MCNC: 0.83 MG/DL (ref 0.5–1.4)
EKG IMPRESSION: NORMAL
ERYTHROCYTE [DISTWIDTH] IN BLOOD BY AUTOMATED COUNT: 44.1 FL (ref 35.9–50)
GFR SERPLBLD CREATININE-BSD FMLA CKD-EPI: 96 ML/MIN/1.73 M 2
GLOBULIN SER CALC-MCNC: 2.6 G/DL (ref 1.9–3.5)
GLUCOSE SERPL-MCNC: 131 MG/DL (ref 65–99)
HCT VFR BLD AUTO: 42.4 % (ref 42–52)
HGB BLD-MCNC: 14.5 G/DL (ref 14–18)
LV EJECT FRACT  99904: 60
LV EJECT FRACT MOD 2C 99903: 60.26
LV EJECT FRACT MOD 4C 99902: 63.52
LV EJECT FRACT MOD BP 99901: 61.57
MCH RBC QN AUTO: 30.2 PG (ref 27–33)
MCHC RBC AUTO-ENTMCNC: 34.2 G/DL (ref 32.3–36.5)
MCV RBC AUTO: 88.3 FL (ref 81.4–97.8)
NT-PROBNP SERPL IA-MCNC: 463 PG/ML (ref 0–125)
PLATELET # BLD AUTO: 153 K/UL (ref 164–446)
PMV BLD AUTO: 12.4 FL (ref 9–12.9)
POTASSIUM SERPL-SCNC: 4.3 MMOL/L (ref 3.6–5.5)
PROT SERPL-MCNC: 6.3 G/DL (ref 6–8.2)
RBC # BLD AUTO: 4.8 M/UL (ref 4.7–6.1)
SODIUM SERPL-SCNC: 139 MMOL/L (ref 135–145)
WBC # BLD AUTO: 14.9 K/UL (ref 4.8–10.8)

## 2024-06-18 PROCEDURE — A9270 NON-COVERED ITEM OR SERVICE: HCPCS

## 2024-06-18 PROCEDURE — 99239 HOSP IP/OBS DSCHRG MGMT >30: CPT | Mod: 25,FS | Performed by: INTERNAL MEDICINE

## 2024-06-18 PROCEDURE — 93321 DOPPLER ECHO F-UP/LMTD STD: CPT

## 2024-06-18 PROCEDURE — 71045 X-RAY EXAM CHEST 1 VIEW: CPT

## 2024-06-18 PROCEDURE — 83880 ASSAY OF NATRIURETIC PEPTIDE: CPT

## 2024-06-18 PROCEDURE — 700111 HCHG RX REV CODE 636 W/ 250 OVERRIDE (IP): Mod: JZ

## 2024-06-18 PROCEDURE — 93325 DOPPLER ECHO COLOR FLOW MAPG: CPT | Mod: 26 | Performed by: INTERNAL MEDICINE

## 2024-06-18 PROCEDURE — 36415 COLL VENOUS BLD VENIPUNCTURE: CPT

## 2024-06-18 PROCEDURE — 306311 ANTI-EMBOLISM STOCKINGS XXLRG LNG: Performed by: INTERNAL MEDICINE

## 2024-06-18 PROCEDURE — 93005 ELECTROCARDIOGRAM TRACING: CPT

## 2024-06-18 PROCEDURE — 97161 PT EVAL LOW COMPLEX 20 MIN: CPT

## 2024-06-18 PROCEDURE — 97535 SELF CARE MNGMENT TRAINING: CPT

## 2024-06-18 PROCEDURE — 93010 ELECTROCARDIOGRAM REPORT: CPT | Performed by: INTERNAL MEDICINE

## 2024-06-18 PROCEDURE — 85027 COMPLETE CBC AUTOMATED: CPT

## 2024-06-18 PROCEDURE — 80053 COMPREHEN METABOLIC PANEL: CPT

## 2024-06-18 PROCEDURE — 700102 HCHG RX REV CODE 250 W/ 637 OVERRIDE(OP)

## 2024-06-18 PROCEDURE — 93308 TTE F-UP OR LMTD: CPT | Mod: 26 | Performed by: INTERNAL MEDICINE

## 2024-06-18 RX ORDER — ASPIRIN 81 MG/1
81 TABLET ORAL DAILY
Qty: 100 TABLET | Refills: 3 | Status: SHIPPED | OUTPATIENT
Start: 2024-06-19

## 2024-06-18 RX ADMIN — ASPIRIN 81 MG: 81 TABLET, COATED ORAL at 05:40

## 2024-06-18 RX ADMIN — FUROSEMIDE 20 MG: 10 INJECTION, SOLUTION INTRAVENOUS at 05:41

## 2024-06-18 RX ADMIN — POTASSIUM CHLORIDE 20 MEQ: 1500 TABLET, EXTENDED RELEASE ORAL at 05:40

## 2024-06-18 RX ADMIN — ACETAMINOPHEN 650 MG: 325 TABLET, FILM COATED ORAL at 03:43

## 2024-06-18 RX ADMIN — DOCUSATE SODIUM 100 MG: 100 CAPSULE, LIQUID FILLED ORAL at 06:00

## 2024-06-18 RX ADMIN — LEVOTHYROXINE SODIUM 125 MCG: 0.12 TABLET ORAL at 05:40

## 2024-06-18 ASSESSMENT — SOCIAL DETERMINANTS OF HEALTH (SDOH)
IN THE PAST 12 MONTHS, HAS THE ELECTRIC, GAS, OIL, OR WATER COMPANY THREATENED TO SHUT OFF SERVICE IN YOUR HOME?: NO
WITHIN THE PAST 12 MONTHS, THE FOOD YOU BOUGHT JUST DIDN'T LAST AND YOU DIDN'T HAVE MONEY TO GET MORE: NEVER TRUE
WITHIN THE PAST 12 MONTHS, YOU WORRIED THAT YOUR FOOD WOULD RUN OUT BEFORE YOU GOT THE MONEY TO BUY MORE: NEVER TRUE

## 2024-06-18 ASSESSMENT — COGNITIVE AND FUNCTIONAL STATUS - GENERAL
SUGGESTED CMS G CODE MODIFIER MOBILITY: CH
MOBILITY SCORE: 24

## 2024-06-18 ASSESSMENT — FIBROSIS 4 INDEX: FIB4 SCORE: 2.81

## 2024-06-18 ASSESSMENT — PAIN DESCRIPTION - PAIN TYPE: TYPE: ACUTE PAIN

## 2024-06-18 ASSESSMENT — GAIT ASSESSMENTS
DISTANCE (FEET): 600
GAIT LEVEL OF ASSIST: SUPERVISED

## 2024-06-18 NOTE — PROGRESS NOTES
Dr. Venkat Oconnell was notified of the patient's WBC going from 6.4 yesterday (6/17 at 0900) to 14.9 today (6/18 at 0137).

## 2024-06-18 NOTE — DISCHARGE SUMMARY
PRIMARY DISCHARGE DIAGNOSIS: Status post transcatheter aortic valve replacement.    DISCHARGE DIAGNOSIS:  S/P TAVR    PROCEDURES/TESTIN. Successful transcatheter aortic valve replacement (TAVR) with #26 Tim Hortencia S3 Ultra Resilia deployed at nominal volume +1 cc via the transfemoral approach on 2024 under general anesthesia  2. Intraoperative transesophageal echocardiogram showing   CONCLUSIONS  s/p TAVR with 26 Tim Hortencia valve for severe symptomatic aortic   stenosis and moderate aortic insufficiency.  Prosthetic valve is well positioned and functioning appropriately with   trace paravalvular leak.   Mean gradient 6mmHg, Vmax 1.6m/s, EOA of 3.12 cm2.   Preserved biventricular systolic function.   No evidence of pericardial effusion or dissection at end of procedure.   Findings communicated with surgical team intraoperatively.  3. CXR on 2024 showing   No acute cardiopulmonary disease  4. EKG on 2024    SR, LBBB, rate 77, 0.188/0.175/0.531  5. Echocardiogram on 2024 showing trivial PVL    Labs   Lab Results   Component Value Date/Time    SODIUM 139 2024 01:37 AM    POTASSIUM 4.3 2024 01:37 AM    CHLORIDE 105 2024 01:37 AM    CO2 23 2024 01:37 AM    GLUCOSE 131 (H) 2024 01:37 AM    BUN 15 2024 01:37 AM    CREATININE 0.83 2024 01:37 AM    BUNCREATRAT 16 2017 10:15 AM       Lab Results   Component Value Date/Time    PROTHROMBTM 13.3 2024 06:39 AM    INR 1.00 2024 06:39 AM       Lab Results   Component Value Date/Time    WBC 14.9 (H) 2024 01:37 AM    RBC 4.80 2024 01:37 AM    HEMOGLOBIN 14.5 2024 01:37 AM    HEMATOCRIT 42.4 2024 01:37 AM    MCV 88.3 2024 01:37 AM    MCH 30.2 2024 01:37 AM    MCHC 34.2 2024 01:37 AM    MPV 12.4 2024 01:37 AM         HOSPITAL COURSE: The patient is a pleasant 68 year old male with severe symptomatic aortic stenosis, type 2 diabetes, LUIS,  hypothyroidism, GERD. Due to the patient's symptoms, the patient underwent successful TAVR described as above. Post-procedure, the patient did well. They did require IV diuresis during their stay and will not continue on oral diuretics post-operatively due to euvolemia. Acute diastolic heart failure exacerbation as evidenced by: signs and symptoms: weakness/fatigue; Echocardiogram showing severe aortic stenosis and moderate pulmonic insufficiency; corroborated by: elevated BNP (496), LVEDP of 27.  Postoperatively he did have a small jump in his WBCs to 14, patient is afebrile and asymptomatic.  They were able to ambulate without difficulty. No further events were noted during their stay. They are now off oxygen and are to be discharged to home.    DISCHARGE MEDICATIONS:      Medication List        START taking these medications        Instructions   aspirin 81 MG EC tablet  Start taking on: June 19, 2024   Take 1 Tablet by mouth every day.  Dose: 81 mg            CONTINUE taking these medications        Instructions   cyanocobalamin 100 MCG Tabs  Commonly known as: Vitamin B-12   Take 100 mcg by mouth every day.  Dose: 100 mcg     levothyroxine 125 MCG Tabs  Commonly known as: Synthroid   Take 1 tablet by mouth once daily  Dose: 125 mcg     metFORMIN 500 MG Tabs  Commonly known as: Glucophage   Take 1 tablet by mouth once daily  Dose: 500 mg     multivitamin Tabs   Take 1 Tablet by mouth every day.  Dose: 1 Tablet              DISCHARGE INSTRUCTIONS: They are given discharge instructions on potential post-operative complications and symptoms to watch out for. Their groin sites were checked and were clean, dry, and intact. Patient or family to notify us for any complications noted on the discharge instructions. They will follow up with myself, MARISOL Hess, on Tuesday in our cardiology office. They will not need labs before their follow up appointment. They will then follow up with myself with a repeat  echocardiogram in one month for post TAVR assessment.      Future Appointments   Date Time Provider Department Center   6/25/2024 12:45 PM ROSA Nair None   7/22/2024 10:30 AM [facility] None   7/23/2024  9:45 AM ROSA Nair None   8/30/2024  8:00 AM Rafal Renee M.D. [facility]   6/17/2025 10:30 AM [facility] ECHO [facility]1 [facility] None       Discharge time spent with patient was 24 minutes.

## 2024-06-18 NOTE — CARE PLAN
The patient is Stable - Low risk of patient condition declining or worsening    Shift Goals  Clinical Goals: cardiac monitoring,comfort  Patient Goals: rest, sleep, d/c tomorrow    Progress made toward(s) clinical / shift goals:      Problem: Psychosocial  Goal: Patient's ability to verbalize feelings about condition will improve  Outcome: Progressing     Problem: Hemodynamics  Goal: Patient's hemodynamics, fluid balance and neurologic status will be stable or improve  Outcome: Progressing     Problem: Urinary Elimination  Goal: Establish and maintain regular urinary output  Outcome: Progressing       Patient is not progressing towards the following goals:

## 2024-06-18 NOTE — THERAPY
Physical Therapy   Initial Evaluation     Patient Name: Venkat Avitia  Age:  68 y.o., Sex:  male  Medical Record #: [id number]  Today's Date: 2024     Precautions  Precautions: Fall Risk;Cardiac Precautions (See Comments)    Assessment  Patient is 68 y.o. male  s/p TAVR.  Additional factors influencing patient status / progress : today, pt is seen for phase I CR education with handout given and reviewed, covering walking progression, use of RPE scale and HR to monitor progress and phase II CR is introduced. Pt tolerated ambulation x 600 feet with mild SOB, good endurance, BP at rest 123/68, BP in sitting after walkin/62, HR at rest 75, HR during ambulation 98. Pt lives with supportive spouse, and has a very active lifestyle at baseline. See details below.       Plan         DC Equipment Recommendations: None  Discharge Recommendations: Other - (pt to discuss phase II CR at his follow up.)  Patient will not be actively followed for physical therapy services at this time, however may be seen if requested by physician for 1 more visit within 30 days to address any discharge or equipment needs.             Objective       24 0856   Charge Group   PT Evaluation PT Evaluation Low   PT Self Care / Home Evaluation (Units) 1   Total Time Spent   PT Total Time Yes   PT Evaluation Time Spent (Mins) 15   PT Self Care/Home Evaluation Time Spent (Mins) 15   PT Total Time Spent (Calculated) 30   Initial Contact Note    Initial Contact Note Order Received and Verified, Evaluation Only - Patient Does Not Require Further Acute Physical Therapy at this Time.  However, May Benefit from Post Acute Therapy for Higher Level Functional Deficits.   Precautions   Precautions Fall Risk;Cardiac Precautions (See Comments)   Vitals   Pulse 98  (98 during ambulation. 75 at rest)   Blood Pressure  125/62  (sitting after walking. 123/68 supine before walking)   Pulse Oximetry 96 %   O2 Delivery Device None - Room Air   Pain 0 -  10 Group   Therapist Pain Assessment 0;During Activity;Nurse Notified   Prior Living Situation   Prior Services None   Housing / Facility 1 Story House   Lives with - Patient's Self Care Capacity Spouse  (home, can help if needed)   Prior Level of Functional Mobility   Bed Mobility Independent   Transfer Status Independent   Ambulation Independent   Ambulation Distance community   Assistive Devices Used None   Stairs Independent   Cognition    Level of Consciousness Alert   Active ROM Lower Body    Active ROM Lower Body  WDL   Strength Lower Body   Lower Body Strength  WDL   Balance Assessment   Sitting Balance (Static) Good   Sitting Balance (Dynamic) Good   Standing Balance (Static) Good   Standing Balance (Dynamic) Good   Weight Shift Sitting Good   Weight Shift Standing Good   Comments no AD   Bed Mobility    Supine to Sit Supervised   Sit to Supine Supervised   Scooting Supervised   Rolling Supervised   Gait Analysis   Gait Level Of Assist Supervised   Assistive Device None   Distance (Feet) 600   # of Times Distance was Traveled 1   Comments mild SOB with ambulation   Functional Mobility   Sit to Stand Supervised   Bed, Chair, Wheelchair Transfer Supervised   6 Clicks Assessment - How much HELP from from another person do you currently need... (If the patient hasn't done an activity recently, how much help from another person do you think he/she would need if he/she tried?)   Turning from your back to your side while in a flat bed without using bedrails? 4   Moving from lying on your back to sitting on the side of a flat bed without using bedrails? 4   Moving to and from a bed to a chair (including a wheelchair)? 4   Standing up from a chair using your arms (e.g., wheelchair, or bedside chair)? 4   Walking in hospital room? 4   Climbing 3-5 steps with a railing? 4   6 clicks Mobility Score 24   Activity Tolerance   Standing 6+   Education Group   Education Provided Cardiac Precautions   Cardiac Precautions  Patient Response Patient;Family;Acceptance;Explanation;Handout;Verbal Demonstration;Action Demonstration   Anticipated Discharge Equipment and Recommendations   DC Equipment Recommendations None   Discharge Recommendations Other -  (pt to discuss phase II CR at his follow up.)   Interdisciplinary Plan of Care Collaboration   IDT Collaboration with  Nursing   Patient Position at End of Therapy In Bed;Call Light within Reach;Tray Table within Reach;Phone within Reach;Family / Friend in Room   Collaboration Comments nsg updated   Session Information   Date / Session Number  6/18-1x only, dc needs only

## 2024-06-18 NOTE — PROGRESS NOTES
Monitor summary:        Rhythm: SR  Rate: 66-76  Ectopy: (R)PAC, (R)PVC  Measurements: 19./.07/.38        12hr chart check

## 2024-06-19 ENCOUNTER — TELEPHONE (OUTPATIENT)
Dept: HEALTH INFORMATION MANAGEMENT | Facility: OTHER | Age: 69
End: 2024-06-19
Payer: MEDICARE

## 2024-06-25 ENCOUNTER — OFFICE VISIT (OUTPATIENT)
Dept: CARDIOLOGY | Facility: MEDICAL CENTER | Age: 69
End: 2024-06-25
Payer: MEDICARE

## 2024-06-25 VITALS
WEIGHT: 209 LBS | HEART RATE: 69 BPM | RESPIRATION RATE: 18 BRPM | OXYGEN SATURATION: 97 % | BODY MASS INDEX: 28.31 KG/M2 | HEIGHT: 72 IN | DIASTOLIC BLOOD PRESSURE: 74 MMHG | SYSTOLIC BLOOD PRESSURE: 112 MMHG

## 2024-06-25 DIAGNOSIS — Z95.2 S/P TAVR (TRANSCATHETER AORTIC VALVE REPLACEMENT): ICD-10-CM

## 2024-06-25 PROCEDURE — 99214 OFFICE O/P EST MOD 30 MIN: CPT

## 2024-06-25 PROCEDURE — 3078F DIAST BP <80 MM HG: CPT

## 2024-06-25 PROCEDURE — 93005 ELECTROCARDIOGRAM TRACING: CPT

## 2024-06-25 PROCEDURE — 3074F SYST BP LT 130 MM HG: CPT

## 2024-06-25 ASSESSMENT — FIBROSIS 4 INDEX: FIB4 SCORE: 2.81

## 2024-06-25 ASSESSMENT — ENCOUNTER SYMPTOMS
PALPITATIONS: 0
PND: 0
CONSTITUTIONAL NEGATIVE: 1
MUSCULOSKELETAL NEGATIVE: 1
DEPRESSION: 0
GASTROINTESTINAL NEGATIVE: 1
EYES NEGATIVE: 1
ORTHOPNEA: 0
NEUROLOGICAL NEGATIVE: 1
SHORTNESS OF BREATH: 0
NERVOUS/ANXIOUS: 0

## 2024-06-25 NOTE — PROGRESS NOTES
Chief Complaint   Patient presents with    Aortic Stenosis       Subjective     Rick David Avitia is a 68 y.o. male who presents today  for their 1 week post TAVR visit.  They have a history of severe symptomatic aortic stenosis, type 2 diabetes, LUIS, hypothyroidism, GERD     They are status post TAVR with #26 Tim Hortencia S3 Ultra Resilia deployed at nominal volume +1 cc via the transfemoral approach on 6/17/2024 under general anesthesia with Dr. De Dios    They are accompanied today by his wife    Since their discharge they have been feeling well overall. Walking about 15 minutes at a time. NYHA class I symptoms        Past Medical History:   Diagnosis Date    BPH (benign prostatic hyperplasia)     Breath shortness     Heart burn     Heart murmur     Hyperglycemia 04/12/2021    Mild mitral regurgitation by prior echocardiogram 09/2013    nl ef and mild concentric LVH    Moderate aortic stenosis 10/14/2019    Nonrheumatic aortic valve stenosis 10/14/2019    Postoperative hypothyroidism 10/26/2015    Pre-diabetes     Snoring      Past Surgical History:   Procedure Laterality Date    TRANSCATHETER AORTIC VALVE REPLACEMENT Bilateral 6/17/2024    Procedure: TRANSCATHETER AORTIC VALVE REPLACEMENT;  Surgeon: Scotty De Dios M.D.;  Location: SURGERY [facility];  Service: Cardiac    ECHOCARDIOGRAM, TRANSESOPHAGEAL, INTRAOPERATIVE N/A 6/17/2024    Procedure: ECHOCARDIOGRAM, TRANSESOPHAGEAL, INTRAOPERATIVE;  Surgeon: Scotty De Dios M.D.;  Location: SURGERY [facility];  Service: Cardiac    CHOLECYSTECTOMY      Jeff    OTHER  1970    THYROID LOBECTOMY      TURP-VAPOR       Family History   Problem Relation Age of Onset    Stroke Father     Prostate cancer Brother     Arthritis Other     Heart Disease Other     Hypertension Other     Stroke Other      Social History     Socioeconomic History    Marital status:      Spouse name: Nichole    Number of children: 2    Years of education: Not on file    Highest education  level: Not on file   Occupational History    Occupation: retired     Comment: [employer]   Tobacco Use    Smoking status: Never    Smokeless tobacco: Never   Vaping Use    Vaping status: Never Used   Substance and Sexual Activity    Alcohol use: Yes     Alcohol/week: 4.2 oz     Types: 7 Cans of beer per week    Drug use: Never    Sexual activity: Not on file   Other Topics Concern     Service No    Blood Transfusions Not Asked    Caffeine Concern No    Occupational Exposure Not Asked    Hobby Hazards Not Asked    Sleep Concern Not Asked    Stress Concern Not Asked    Weight Concern Not Asked    Special Diet Not Asked    Back Care Not Asked    Exercise No    Bike Helmet Not Asked    Seat Belt Yes    Self-Exams Not Asked   Social History Narrative    Not on file     Social Determinants of Health     Financial Resource Strain: Not on file   Food Insecurity: No Food Insecurity (6/18/2024)    Hunger Vital Sign     Worried About Running Out of Food in the Last Year: Never true     Ran Out of Food in the Last Year: Never true   Transportation Needs: No Transportation Needs (6/18/2024)    PRAPARE - Transportation     Lack of Transportation (Medical): No     Lack of Transportation (Non-Medical): No   Physical Activity: Inactive (11/8/2021)    Exercise Vital Sign     Days of Exercise per Week: 0 days     Minutes of Exercise per Session: 0 min   Stress: Not on file   Social Connections: Not on file   Intimate Partner Violence: Not At Risk (6/17/2024)    Humiliation, Afraid, Rape, and Kick questionnaire     Fear of Current or Ex-Partner: No     Emotionally Abused: No     Physically Abused: No     Sexually Abused: No   Housing Stability: Low Risk  (6/18/2024)    Housing Stability Vital Sign     Unable to Pay for Housing in the Last Year: No     Number of Places Lived in the Last Year: 1     Unstable Housing in the Last Year: No     No Known Allergies  Outpatient Encounter Medications as of 6/25/2024   Medication  Sig Dispense Refill    aspirin 81 MG EC tablet Take 1 Tablet by mouth every day. 100 Tablet 3    levothyroxine (SYNTHROID) 125 MCG Tab Take 1 tablet by mouth once daily 90 Tablet 2    cyanocobalamin (VITAMIN B-12) 100 MCG Tab Take 100 mcg by mouth every day.      multivitamin Tab Take 1 Tablet by mouth every day.      metFORMIN (GLUCOPHAGE) 500 MG Tab Take 1 tablet by mouth once daily (Patient taking differently: Take 500 mg by mouth every day.       DO NOT TAKE DAY OF SURGERY  AND FOR 48 HOURS AFTER  AS INSTRUCTED BY CARDIOLOGY) 90 Tablet 3     No facility-administered encounter medications on file as of 6/25/2024.     Review of Systems   Constitutional: Negative.    HENT: Negative.     Eyes: Negative.    Respiratory:  Negative for shortness of breath.    Cardiovascular:  Negative for chest pain, palpitations, orthopnea, leg swelling and PND.   Gastrointestinal: Negative.    Genitourinary: Negative.    Musculoskeletal: Negative.    Skin: Negative.    Neurological: Negative.    Endo/Heme/Allergies: Negative.    Psychiatric/Behavioral:  Negative for depression. The patient is not nervous/anxious.               Objective     /74 (BP Location: Left arm, Patient Position: Sitting, BP Cuff Size: Adult)   Pulse 69   Resp 18   Ht 1.829 m (6')   Wt 94.8 kg (209 lb)   SpO2 97%   BMI 28.35 kg/m²     Physical Exam  Constitutional:       Appearance: Normal appearance.   HENT:      Head: Normocephalic.   Neck:      Vascular: No JVD.   Cardiovascular:      Rate and Rhythm: Normal rate and regular rhythm.      Pulses: Normal pulses.      Heart sounds: Normal heart sounds. No murmur heard.     No friction rub.      Comments: Groin site with mild amount of bruising and small nodule  Pulmonary:      Effort: Pulmonary effort is normal.      Breath sounds: Normal breath sounds.   Abdominal:      Palpations: Abdomen is soft.   Musculoskeletal:         General: Normal range of motion.      Right lower leg: No edema.      Left  lower leg: No edema.   Skin:     General: Skin is warm and dry.   Neurological:      General: No focal deficit present.      Mental Status: He is alert and oriented to person, place, and time.   Psychiatric:         Mood and Affect: Mood normal.         Behavior: Behavior normal.            Lab Results   Component Value Date/Time    CHOLSTRLTOT 134 08/17/2023 08:19 AM    LDL 50 08/17/2023 08:19 AM    HDL 63.0 (H) 08/17/2023 08:19 AM    TRIGLYCERIDE 105 08/17/2023 08:19 AM       Lab Results   Component Value Date/Time    SODIUM 139 06/18/2024 01:37 AM    POTASSIUM 4.3 06/18/2024 01:37 AM    CHLORIDE 105 06/18/2024 01:37 AM    CO2 23 06/18/2024 01:37 AM    GLUCOSE 131 (H) 06/18/2024 01:37 AM    BUN 15 06/18/2024 01:37 AM    CREATININE 0.83 06/18/2024 01:37 AM    BUNCREATRAT 16 07/26/2017 10:15 AM     Lab Results   Component Value Date/Time    ALKPHOSPHAT 57 06/18/2024 01:37 AM    ASTSGOT 34 06/18/2024 01:37 AM    ALTSGPT 28 06/18/2024 01:37 AM    TBILIRUBIN 0.4 06/18/2024 01:37 AM       Intraoperative transesophageal echocardiogram showing   CONCLUSIONS  s/p TAVR with 26 Tim Hortencia valve for severe symptomatic aortic   stenosis and moderate aortic insufficiency.  Prosthetic valve is well positioned and functioning appropriately with   trace paravalvular leak.   Mean gradient 6mmHg, Vmax 1.6m/s, EOA of 3.12 cm2.   Preserved biventricular systolic function.   No evidence of pericardial effusion or dissection at end of procedure.   Findings communicated with surgical team intraoperatively.    CXR on 6/18/2024 showing   No acute cardiopulmonary disease    EKG on 6/18/2024    SR, LBBB, rate 77, 0.188/0.175/0.531    Echocardiogram on 6/18/2024 showing trivial PVL    EKG 6/25/2024  SB rate 59, 0.176/0.108/0.414    Assessment & Plan     1. S/P TAVR (transcatheter aortic valve replacement)  EKG          Medical Decision Making: Today's Assessment/Status/Plan:        S/P TAVR, NYHA class I  -doing well post-op  -cont asa  lifelong  -groins CDI with mild bruising and small nodule   -SBE prophylaxis understands  -echo 1 month with structural heart follow up  -reviewed hospital imaging, labs, and EKG  -cardiac rehab referral placed  -EKG shows SB    Follow up with echocardiogram, and myself on 7/23/2024    This note was dictated using Dragon speech recognition software.

## 2024-06-25 NOTE — LETTER
[facility] - Operated by [facility]   [address], [address]  [place] [address]  Phone: [phone]  Fax: [phone]              Patient:                   Venkat Avitia  YOB: 1955        Dear Dr. Renee,      On behalf of [facility]'s Structural Heart Program, we would like to thank you for allowing us to participate in the care of your patient, Mr. Avitia.     He underwent a successful transcatheter aortic valve replacement (TAVR) on   Monday, June 17, 2024.     Your patient is scheduled to follow up with our Structural Heart Program at one month and one year post procedure.     Again, thank you for allowing us to participate in the care of your patient. If you have any questions, please do not hesitate to contact our Structural Heart team.     Sincerely,    [facility] Structural Heart Team    FIOR Encarnacion, RN, Structural Heart Program Nurse Coordinator ([phone])  FIOR Briceño, RN, Structural Heart Program Nurse Coordinator ([phone])                                 Encounter Date: 6/25/2024    JESSE Nair.          PROGRESS NOTE:  Chief Complaint   Patient presents with    Aortic Stenosis       Subjective     Rick Avitia is a 68 y.o. male who presents today  for their 1 week post TAVR visit.  They have a history of severe symptomatic aortic stenosis, type 2 diabetes, LUIS, hypothyroidism, GERD     They are status post TAVR with #26 Tim Hortencia S3 Ultra Resilia deployed at nominal volume +1 cc via the transfemoral approach on 6/17/2024 under general anesthesia with Dr. De Dios    They are accompanied today by his wife    Since their discharge they have been feeling well overall. Walking about 15 minutes at a time. NYHA class I symptoms        Past Medical History:   Diagnosis Date    BPH (benign prostatic hyperplasia)     Breath shortness     Heart burn     Heart murmur      Hyperglycemia 04/12/2021    Mild mitral regurgitation by prior echocardiogram 09/2013    nl ef and mild concentric LVH    Moderate aortic stenosis 10/14/2019    Nonrheumatic aortic valve stenosis 10/14/2019    Postoperative hypothyroidism 10/26/2015    Pre-diabetes     Snoring      Past Surgical History:   Procedure Laterality Date    TRANSCATHETER AORTIC VALVE REPLACEMENT Bilateral 6/17/2024    Procedure: TRANSCATHETER AORTIC VALVE REPLACEMENT;  Surgeon: Scotty De Dios M.D.;  Location: SURGERY [facility];  Service: Cardiac    ECHOCARDIOGRAM, TRANSESOPHAGEAL, INTRAOPERATIVE N/A 6/17/2024    Procedure: ECHOCARDIOGRAM, TRANSESOPHAGEAL, INTRAOPERATIVE;  Surgeon: Scotty De Dios M.D.;  Location: SURGERY [facility];  Service: Cardiac    CHOLECYSTECTOMY      Jeff    OTHER  1970    THYROID LOBECTOMY      TURP-VAPOR       Family History   Problem Relation Age of Onset    Stroke Father     Prostate cancer Brother     Arthritis Other     Heart Disease Other     Hypertension Other     Stroke Other      Social History     Socioeconomic History    Marital status:      Spouse name: Nichole    Number of children: 2    Years of education: Not on file    Highest education level: Not on file   Occupational History    Occupation: retired     Comment: [employer]   Tobacco Use    Smoking status: Never    Smokeless tobacco: Never   Vaping Use    Vaping status: Never Used   Substance and Sexual Activity    Alcohol use: Yes     Alcohol/week: 4.2 oz     Types: 7 Cans of beer per week    Drug use: Never    Sexual activity: Not on file   Other Topics Concern     Service No    Blood Transfusions Not Asked    Caffeine Concern No    Occupational Exposure Not Asked    Hobby Hazards Not Asked    Sleep Concern Not Asked    Stress Concern Not Asked    Weight Concern Not Asked    Special Diet Not Asked    Back Care Not Asked    Exercise No    Bike Helmet Not Asked    Seat Belt Yes    Self-Exams Not Asked   Social History  Narrative    Not on file     Social Determinants of Health     Financial Resource Strain: Not on file   Food Insecurity: No Food Insecurity (6/18/2024)    Hunger Vital Sign     Worried About Running Out of Food in the Last Year: Never true     Ran Out of Food in the Last Year: Never true   Transportation Needs: No Transportation Needs (6/18/2024)    PRAPARE - Transportation     Lack of Transportation (Medical): No     Lack of Transportation (Non-Medical): No   Physical Activity: Inactive (11/8/2021)    Exercise Vital Sign     Days of Exercise per Week: 0 days     Minutes of Exercise per Session: 0 min   Stress: Not on file   Social Connections: Not on file   Intimate Partner Violence: Not At Risk (6/17/2024)    Humiliation, Afraid, Rape, and Kick questionnaire     Fear of Current or Ex-Partner: No     Emotionally Abused: No     Physically Abused: No     Sexually Abused: No   Housing Stability: Low Risk  (6/18/2024)    Housing Stability Vital Sign     Unable to Pay for Housing in the Last Year: No     Number of Places Lived in the Last Year: 1     Unstable Housing in the Last Year: No     No Known Allergies  Outpatient Encounter Medications as of 6/25/2024   Medication Sig Dispense Refill    aspirin 81 MG EC tablet Take 1 Tablet by mouth every day. 100 Tablet 3    levothyroxine (SYNTHROID) 125 MCG Tab Take 1 tablet by mouth once daily 90 Tablet 2    cyanocobalamin (VITAMIN B-12) 100 MCG Tab Take 100 mcg by mouth every day.      multivitamin Tab Take 1 Tablet by mouth every day.      metFORMIN (GLUCOPHAGE) 500 MG Tab Take 1 tablet by mouth once daily (Patient taking differently: Take 500 mg by mouth every day.       DO NOT TAKE DAY OF SURGERY  AND FOR 48 HOURS AFTER  AS INSTRUCTED BY CARDIOLOGY) 90 Tablet 3     No facility-administered encounter medications on file as of 6/25/2024.     Review of Systems   Constitutional: Negative.    HENT: Negative.     Eyes: Negative.    Respiratory:  Negative for shortness of  breath.    Cardiovascular:  Negative for chest pain, palpitations, orthopnea, leg swelling and PND.   Gastrointestinal: Negative.    Genitourinary: Negative.    Musculoskeletal: Negative.    Skin: Negative.    Neurological: Negative.    Endo/Heme/Allergies: Negative.    Psychiatric/Behavioral:  Negative for depression. The patient is not nervous/anxious.               Objective     /74 (BP Location: Left arm, Patient Position: Sitting, BP Cuff Size: Adult)   Pulse 69   Resp 18   Ht 1.829 m (6')   Wt 94.8 kg (209 lb)   SpO2 97%   BMI 28.35 kg/m²     Physical Exam  Constitutional:       Appearance: Normal appearance.   HENT:      Head: Normocephalic.   Neck:      Vascular: No JVD.   Cardiovascular:      Rate and Rhythm: Normal rate and regular rhythm.      Pulses: Normal pulses.      Heart sounds: Normal heart sounds. No murmur heard.     No friction rub.      Comments: Groin site with mild amount of bruising and small nodule  Pulmonary:      Effort: Pulmonary effort is normal.      Breath sounds: Normal breath sounds.   Abdominal:      Palpations: Abdomen is soft.   Musculoskeletal:         General: Normal range of motion.      Right lower leg: No edema.      Left lower leg: No edema.   Skin:     General: Skin is warm and dry.   Neurological:      General: No focal deficit present.      Mental Status: He is alert and oriented to person, place, and time.   Psychiatric:         Mood and Affect: Mood normal.         Behavior: Behavior normal.            Lab Results   Component Value Date/Time    CHOLSTRLTOT 134 08/17/2023 08:19 AM    LDL 50 08/17/2023 08:19 AM    HDL 63.0 (H) 08/17/2023 08:19 AM    TRIGLYCERIDE 105 08/17/2023 08:19 AM       Lab Results   Component Value Date/Time    SODIUM 139 06/18/2024 01:37 AM    POTASSIUM 4.3 06/18/2024 01:37 AM    CHLORIDE 105 06/18/2024 01:37 AM    CO2 23 06/18/2024 01:37 AM    GLUCOSE 131 (H) 06/18/2024 01:37 AM    BUN 15 06/18/2024 01:37 AM    CREATININE 0.83  06/18/2024 01:37 AM    BUNCREATRAT 16 07/26/2017 10:15 AM     Lab Results   Component Value Date/Time    ALKPHOSPHAT 57 06/18/2024 01:37 AM    ASTSGOT 34 06/18/2024 01:37 AM    ALTSGPT 28 06/18/2024 01:37 AM    TBILIRUBIN 0.4 06/18/2024 01:37 AM       Intraoperative transesophageal echocardiogram showing   CONCLUSIONS  s/p TAVR with 26 Tim Hortencia valve for severe symptomatic aortic   stenosis and moderate aortic insufficiency.  Prosthetic valve is well positioned and functioning appropriately with   trace paravalvular leak.   Mean gradient 6mmHg, Vmax 1.6m/s, EOA of 3.12 cm2.   Preserved biventricular systolic function.   No evidence of pericardial effusion or dissection at end of procedure.   Findings communicated with surgical team intraoperatively.    CXR on 6/18/2024 showing   No acute cardiopulmonary disease    EKG on 6/18/2024    SR, LBBB, rate 77, 0.188/0.175/0.531    Echocardiogram on 6/18/2024 showing trivial PVL    EKG 6/25/2024  SB rate 59, 0.176/0.108/0.414    Assessment & Plan     1. S/P TAVR (transcatheter aortic valve replacement)  EKG          Medical Decision Making: Today's Assessment/Status/Plan:        S/P TAVR, NYHA class I  -doing well post-op  -cont asa lifelong  -groins CDI with mild bruising and small nodule   -SBE prophylaxis understands  -echo 1 month with structural heart follow up  -reviewed hospital imaging, labs, and EKG  -cardiac rehab referral placed  -EKG shows SB    Follow up with echocardiogram, and myself on 7/23/2024    This note was dictated using Dragon speech recognition software.                    Rfaal Renee M.D.  [address]  [address]  [place] [address]  Via In Basket

## 2024-06-26 LAB — EKG IMPRESSION: NORMAL

## 2024-06-26 PROCEDURE — 93010 ELECTROCARDIOGRAM REPORT: CPT | Performed by: INTERNAL MEDICINE

## 2024-06-27 ENCOUNTER — DOCUMENTATION (OUTPATIENT)
Dept: CARDIOLOGY | Facility: MEDICAL CENTER | Age: 69
End: 2024-06-27
Payer: MEDICARE

## 2024-07-23 ENCOUNTER — DOCUMENTATION (OUTPATIENT)
Dept: CARDIOLOGY | Facility: MEDICAL CENTER | Age: 69
End: 2024-07-23
Payer: MEDICARE

## 2024-07-23 ENCOUNTER — OFFICE VISIT (OUTPATIENT)
Dept: CARDIOLOGY | Facility: MEDICAL CENTER | Age: 69
End: 2024-07-23
Payer: MEDICARE

## 2024-07-23 VITALS
DIASTOLIC BLOOD PRESSURE: 68 MMHG | WEIGHT: 223 LBS | BODY MASS INDEX: 30.2 KG/M2 | HEART RATE: 70 BPM | SYSTOLIC BLOOD PRESSURE: 128 MMHG | OXYGEN SATURATION: 97 % | RESPIRATION RATE: 18 BRPM | HEIGHT: 72 IN

## 2024-07-23 DIAGNOSIS — G47.33 OBSTRUCTIVE SLEEP APNEA SYNDROME: ICD-10-CM

## 2024-07-23 DIAGNOSIS — Z95.2 S/P TAVR (TRANSCATHETER AORTIC VALVE REPLACEMENT): ICD-10-CM

## 2024-07-23 PROCEDURE — 99212 OFFICE O/P EST SF 10 MIN: CPT

## 2024-07-23 PROCEDURE — 99214 OFFICE O/P EST MOD 30 MIN: CPT

## 2024-07-23 PROCEDURE — 3078F DIAST BP <80 MM HG: CPT

## 2024-07-23 PROCEDURE — 3074F SYST BP LT 130 MM HG: CPT

## 2024-07-23 RX ORDER — AMOXICILLIN 500 MG/1
2000 TABLET, FILM COATED ORAL
Qty: 4 TABLET | Refills: 0 | Status: SHIPPED | OUTPATIENT
Start: 2024-07-23

## 2024-07-23 ASSESSMENT — ENCOUNTER SYMPTOMS
ORTHOPNEA: 0
EYES NEGATIVE: 1
SHORTNESS OF BREATH: 0
NEUROLOGICAL NEGATIVE: 1
BACK PAIN: 1
GASTROINTESTINAL NEGATIVE: 1
NERVOUS/ANXIOUS: 0
PALPITATIONS: 0
PND: 0
DEPRESSION: 0

## 2024-07-23 ASSESSMENT — FIBROSIS 4 INDEX: FIB4 SCORE: 2.81

## 2024-07-24 ENCOUNTER — TELEPHONE (OUTPATIENT)
Dept: CARDIOLOGY | Facility: MEDICAL CENTER | Age: 69
End: 2024-07-24
Payer: MEDICARE

## 2024-08-30 PROBLEM — R73.9 HYPERGLYCEMIA: Status: ACTIVE | Noted: 2024-08-30

## 2024-08-30 PROBLEM — I50.31 ACUTE DIASTOLIC HF (HEART FAILURE) (HCC): Status: RESOLVED | Noted: 2024-06-17 | Resolved: 2024-08-30

## 2024-10-25 ENCOUNTER — PATIENT MESSAGE (OUTPATIENT)
Dept: CARDIOLOGY | Facility: MEDICAL CENTER | Age: 69
End: 2024-10-25
Payer: MEDICARE

## 2024-10-25 DIAGNOSIS — I35.0 MODERATE AORTIC STENOSIS: ICD-10-CM

## 2024-10-25 DIAGNOSIS — I35.0 NONRHEUMATIC AORTIC VALVE STENOSIS: ICD-10-CM

## 2024-10-25 DIAGNOSIS — I35.0 AORTIC VALVE STENOSIS, SEVERE: ICD-10-CM

## 2024-10-25 DIAGNOSIS — Z95.2 S/P TAVR (TRANSCATHETER AORTIC VALVE REPLACEMENT): ICD-10-CM

## 2024-11-06 ENCOUNTER — TELEPHONE (OUTPATIENT)
Dept: CARDIOLOGY | Facility: MEDICAL CENTER | Age: 69
End: 2024-11-06
Payer: MEDICARE

## 2024-12-19 ENCOUNTER — OFFICE VISIT (OUTPATIENT)
Dept: CARDIOLOGY | Facility: MEDICAL CENTER | Age: 69
End: 2024-12-19
Payer: MEDICARE

## 2024-12-19 VITALS
HEART RATE: 58 BPM | BODY MASS INDEX: 31.29 KG/M2 | WEIGHT: 231 LBS | RESPIRATION RATE: 16 BRPM | HEIGHT: 72 IN | DIASTOLIC BLOOD PRESSURE: 84 MMHG | OXYGEN SATURATION: 97 % | SYSTOLIC BLOOD PRESSURE: 140 MMHG

## 2024-12-19 DIAGNOSIS — Z95.2 S/P TAVR (TRANSCATHETER AORTIC VALVE REPLACEMENT): ICD-10-CM

## 2024-12-19 DIAGNOSIS — G47.33 OSA (OBSTRUCTIVE SLEEP APNEA): ICD-10-CM

## 2024-12-19 PROCEDURE — 3077F SYST BP >= 140 MM HG: CPT

## 2024-12-19 PROCEDURE — 99214 OFFICE O/P EST MOD 30 MIN: CPT

## 2024-12-19 PROCEDURE — 99212 OFFICE O/P EST SF 10 MIN: CPT

## 2024-12-19 PROCEDURE — 3079F DIAST BP 80-89 MM HG: CPT

## 2024-12-19 ASSESSMENT — ENCOUNTER SYMPTOMS
PALPITATIONS: 0
PND: 0
SHORTNESS OF BREATH: 0
ORTHOPNEA: 0
GASTROINTESTINAL NEGATIVE: 1
MUSCULOSKELETAL NEGATIVE: 1
NEUROLOGICAL NEGATIVE: 1
CONSTITUTIONAL NEGATIVE: 1
NERVOUS/ANXIOUS: 0
DEPRESSION: 0
EYES NEGATIVE: 1

## 2024-12-19 ASSESSMENT — FIBROSIS 4 INDEX: FIB4 SCORE: 2.48

## 2024-12-19 NOTE — PROGRESS NOTES
"Chief Complaint   Patient presents with    Other     S/P TAVR (transcatheter aortic valve replacement)       Subjective     Rick Avitia is a 69 y.o. male who presents today for follow-up.  They have a history of severe symptomatic aortic stenosis status post TAVR on 6/17/2024, type 2 diabetes, LUIS, hypothyroidism, GERD      Seen by myself on 6/25/2024 since their discharge they have been feeling well overall. Walking about 15 minutes at a time. NYHA class I symptoms       7/23/2024 he has been having back pain which has been making it more difficult to go on his walks, also causing fatigue. NYHA class I.  He is planning to leave for a month on Thursday to travel around [place], [place], [place], and [place]    12/19/2024 he reports that he has been having an \"achy heart later in the day\" not associated with activity. He is able to play pickle ball several times a week and has been tolerating it better as time goes on.  He also reports less soreness in his legs now.    Past Medical History:   Diagnosis Date    BPH (benign prostatic hyperplasia)     Heart burn     Mild mitral regurgitation by prior echocardiogram 09/2013    nl ef and mild concentric LVH    Nonrheumatic aortic valve stenosis 10/14/2019    LUIS (obstructive sleep apnea)     [place] [facility]    Postoperative hypothyroidism 10/26/2015    Pre-diabetes     S/P TAVR (transcatheter aortic valve replacement)      Past Surgical History:   Procedure Laterality Date    TRANSCATHETER AORTIC VALVE REPLACEMENT Bilateral 6/17/2024    Procedure: TRANSCATHETER AORTIC VALVE REPLACEMENT;  Surgeon: Scotty De Dios M.D.;  Location: SURGERY [facility];  Service: Cardiac    ECHOCARDIOGRAM, TRANSESOPHAGEAL, INTRAOPERATIVE N/A 6/17/2024    Procedure: ECHOCARDIOGRAM, TRANSESOPHAGEAL, INTRAOPERATIVE;  Surgeon: Scotty De Dios M.D.;  Location: SURGERY [facility];  Service: Cardiac    CHOLECYSTECTOMY      Jeff    OTHER  1970    THYROID LOBECTOMY      TURP-VAPOR       Family " History   Problem Relation Age of Onset    Stroke Father     Prostate cancer Brother     Arthritis Other     Heart Disease Other     Hypertension Other     Stroke Other      Social History     Socioeconomic History    Marital status:      Spouse name: Nichole    Number of children: 2    Years of education: Not on file    Highest education level: Not on file   Occupational History    Occupation: retired     Comment: [employer]   Tobacco Use    Smoking status: Never    Smokeless tobacco: Never   Vaping Use    Vaping status: Never Used   Substance and Sexual Activity    Alcohol use: Not Currently     Alcohol/week: 4.2 - 8.4 oz     Types: 7 - 14 Standard drinks or equivalent per week    Drug use: Never    Sexual activity: Not on file   Other Topics Concern     Service No    Blood Transfusions Not Asked    Caffeine Concern No    Occupational Exposure Not Asked    Hobby Hazards Not Asked    Sleep Concern Not Asked    Stress Concern Not Asked    Weight Concern Not Asked    Special Diet Not Asked    Back Care Not Asked    Exercise No    Bike Helmet Not Asked    Seat Belt Yes    Self-Exams Not Asked   Social History Narrative    Not on file     Social Drivers of Health     Financial Resource Strain: Not on file   Food Insecurity: No Food Insecurity (6/18/2024)    Hunger Vital Sign     Worried About Running Out of Food in the Last Year: Never true     Ran Out of Food in the Last Year: Never true   Transportation Needs: No Transportation Needs (6/18/2024)    PRAPARE - Transportation     Lack of Transportation (Medical): No     Lack of Transportation (Non-Medical): No   Physical Activity: Inactive (8/30/2024)    Exercise Vital Sign     Days of Exercise per Week: 0 days     Minutes of Exercise per Session: 0 min   Stress: Not on file   Social Connections: Not on file   Intimate Partner Violence: Not At Risk (6/17/2024)    Humiliation, Afraid, Rape, and Kick questionnaire     Fear of Current or Ex-Partner: No      Emotionally Abused: No     Physically Abused: No     Sexually Abused: No   Housing Stability: Low Risk  (6/18/2024)    Housing Stability Vital Sign     Unable to Pay for Housing in the Last Year: No     Number of Places Lived in the Last Year: 1     Unstable Housing in the Last Year: No     No Known Allergies  Outpatient Encounter Medications as of 12/19/2024   Medication Sig Dispense Refill    Magnesium Hydroxide (MAGNESIA PO) Take  by mouth.      Potassium (POTASSIMIN PO) Take  by mouth.      Amoxicillin 500 MG Tab Take 4 Tablets by mouth one time as needed (30 minutes to 1 hour before dental work) for up to 1 dose. 4 Tablet 0    aspirin 81 MG EC tablet Take 1 Tablet by mouth every day. 100 Tablet 3    levothyroxine (SYNTHROID) 125 MCG Tab Take 1 tablet by mouth once daily 90 Tablet 2    cyanocobalamin (VITAMIN B-12) 100 MCG Tab Take 100 mcg by mouth every day.      multivitamin Tab Take 1 Tablet by mouth every day.      metFORMIN (GLUCOPHAGE) 500 MG Tab Take 1 tablet by mouth once daily 90 Tablet 3    cephALEXin (KEFLEX) 500 MG Cap Take 1 Capsule by mouth 3 times a day. (Patient not taking: Reported on 12/19/2024) 21 Capsule 0     No facility-administered encounter medications on file as of 12/19/2024.     Review of Systems   Constitutional: Negative.    HENT: Negative.     Eyes: Negative.    Respiratory:  Negative for shortness of breath.    Cardiovascular:  Positive for chest pain. Negative for palpitations, orthopnea, leg swelling and PND.   Gastrointestinal: Negative.    Genitourinary: Negative.    Musculoskeletal: Negative.    Skin: Negative.    Neurological: Negative.    Endo/Heme/Allergies: Negative.    Psychiatric/Behavioral:  Negative for depression. The patient is not nervous/anxious.               Objective     BP (!) 140/84 (BP Location: Right arm, Patient Position: Sitting, BP Cuff Size: Adult)   Pulse (!) 58   Resp 16   Ht 1.829 m (6')   Wt 105 kg (231 lb)   SpO2 97%   BMI 31.33 kg/m²      Physical Exam  Constitutional:       Appearance: Normal appearance.   HENT:      Head: Normocephalic.   Neck:      Vascular: No JVD.   Cardiovascular:      Rate and Rhythm: Normal rate and regular rhythm.      Pulses: Normal pulses.      Heart sounds: Normal heart sounds. No murmur heard.     No friction rub.   Pulmonary:      Effort: Pulmonary effort is normal.      Breath sounds: Normal breath sounds.   Abdominal:      Palpations: Abdomen is soft.   Musculoskeletal:         General: Normal range of motion.      Right lower leg: No edema.      Left lower leg: No edema.   Skin:     General: Skin is warm and dry.   Neurological:      General: No focal deficit present.      Mental Status: He is alert and oriented to person, place, and time.   Psychiatric:         Mood and Affect: Mood normal.         Behavior: Behavior normal.            Lab Results   Component Value Date/Time    WBC 6.3 12/17/2024 08:20 AM    RBC 4.96 12/17/2024 08:20 AM    HEMOGLOBIN 14.7 12/17/2024 08:20 AM    HEMATOCRIT 43.5 12/17/2024 08:20 AM    MCV 87.7 12/17/2024 08:20 AM    MCH 29.6 12/17/2024 08:20 AM    MCHC 33.8 12/17/2024 08:20 AM    MPV 12.3 (H) 12/17/2024 08:20 AM      Lab Results   Component Value Date/Time    CHOLSTRLTOT 142 08/29/2024 08:11 AM    LDL 57 08/29/2024 08:11 AM    HDL 65.0 (H) 08/29/2024 08:11 AM    TRIGLYCERIDE 101 08/29/2024 08:11 AM       Lab Results   Component Value Date/Time    SODIUM 139 06/18/2024 01:37 AM    POTASSIUM 4.3 06/18/2024 01:37 AM    CHLORIDE 105 06/18/2024 01:37 AM    CO2 23 06/18/2024 01:37 AM    GLUCOSE 131 (H) 06/18/2024 01:37 AM    BUN 15 06/18/2024 01:37 AM    CREATININE 0.83 06/18/2024 01:37 AM    BUNCREATRAT 16 07/26/2017 10:15 AM     Lab Results   Component Value Date/Time    ALKPHOSPHAT 57 06/18/2024 01:37 AM    ASTSGOT 34 06/18/2024 01:37 AM    ALTSGPT 28 06/18/2024 01:37 AM    TBILIRUBIN 0.4 06/18/2024 01:37 AM      Angiogram 6/11/2024  FINDINGS:  I.  HEMODYNAMICS              Ao: 86/56  mmHg                   II. CORONARY ANGIOGRAPHY:  Left main coronary artery: Large bifurcating no CAD  Left anterior descending artery: Large-caliber no CAD  Left circumflex coronary artery: Large-caliber no CAD  Right coronary artery: Moderate caliber nondominant no CAD     III.  Thoracic aortogram:  Normal caliber thoracic aorta without significant calcification.     IV.  Abdominal aortogram  Normal abdominal aortogram with iliofemoral runoff.    Intraoperative transesophageal echocardiogram showing   CONCLUSIONS  s/p TAVR with 26 Tim Hortencia valve for severe symptomatic aortic   stenosis and moderate aortic insufficiency.  Prosthetic valve is well positioned and functioning appropriately with   trace paravalvular leak.   Mean gradient 6mmHg, Vmax 1.6m/s, EOA of 3.12 cm2.   Preserved biventricular systolic function.   No evidence of pericardial effusion or dissection at end of procedure.   Findings communicated with surgical team intraoperatively.     CXR on 6/18/2024 showing   No acute cardiopulmonary disease     EKG on 6/18/2024    SR, LBBB, rate 77, 0.188/0.175/0.531     Echocardiogram on 6/18/2024 showing trivial PVL     EKG 6/25/2024  SB rate 59, 0.176/0.108/0.414     Echocardiogram 7/22/2024  CONCLUSIONS  Normal LV systolic function  TAVR valve with normal function but borderline elevated transvalvular   velcoity at 2.8 m/s (normal <3 m/s), mean gradient 18 mmHg. LVOT VTI is   elevated at 27 cm.   Probably mild PVL but poorly characterized - correlate clinically    Assessment & Plan     1. S/P TAVR (transcatheter aortic valve replacement)        2. LUIS (obstructive sleep apnea)            Medical Decision Making: Today's Assessment/Status/Plan:        S/P TAVR, NYHA class I  -doing well   -cont asa lifelong  -SBE prophylaxis, provided Rx for amoxicillin  -borderline elevated transvalvular gradients on 1 month post-TAVR echo   -reviewed hospital imaging, labs, and EKG  -cardiac rehab referral placed  - LDH  369, Hgb normal and BNP 72 (unfortunately insurance declined payment for BNP, we will investigate this further)  -Overall he reports that he has been feeling better, his chest achiness is not associated with activity, pre-TAVR angiogram showed no CAD  -Elevated blood pressure reading today but previously well-controlled, monitor at home     LUIS  - he got his CPAP machine and has been doing well with it       Follow up with MARISOL Hess in 6 months     This note was dictated using Dragon speech recognition software.

## 2024-12-24 ENCOUNTER — TELEPHONE (OUTPATIENT)
Dept: CARDIOLOGY | Facility: MEDICAL CENTER | Age: 69
End: 2024-12-24
Payer: MEDICARE

## 2024-12-24 NOTE — TELEPHONE ENCOUNTER
----- Message from Nurse Practitioner ROSA Yeung sent at 12/19/2024  9:37 AM PST -----  Felton Lawrence,    Patient is saying that insurance denied payment for BNP that he got the other day.  Can you see if there is anything that we can do to help get it paid for him    Thank you

## 2024-12-24 NOTE — TELEPHONE ENCOUNTER
Billing diagnosis code correction form completed and signed by . Faxed to provided number on form and fax confirmation received. Forms scanned into [employer] for reference, completed status received.

## 2025-06-23 ENCOUNTER — RESULTS FOLLOW-UP (OUTPATIENT)
Dept: CARDIOLOGY | Facility: MEDICAL CENTER | Age: 70
End: 2025-06-23

## 2025-06-24 ENCOUNTER — DOCUMENTATION (OUTPATIENT)
Dept: CARDIOLOGY | Facility: MEDICAL CENTER | Age: 70
End: 2025-06-24

## 2025-06-24 ENCOUNTER — OFFICE VISIT (OUTPATIENT)
Dept: CARDIOLOGY | Facility: MEDICAL CENTER | Age: 70
End: 2025-06-24
Payer: MEDICARE

## 2025-06-24 VITALS
DIASTOLIC BLOOD PRESSURE: 70 MMHG | WEIGHT: 211 LBS | OXYGEN SATURATION: 96 % | SYSTOLIC BLOOD PRESSURE: 102 MMHG | RESPIRATION RATE: 16 BRPM | HEIGHT: 72 IN | BODY MASS INDEX: 28.58 KG/M2 | HEART RATE: 72 BPM

## 2025-06-24 DIAGNOSIS — Z95.2 S/P TAVR (TRANSCATHETER AORTIC VALVE REPLACEMENT): Primary | ICD-10-CM

## 2025-06-24 DIAGNOSIS — G47.33 OSA (OBSTRUCTIVE SLEEP APNEA): ICD-10-CM

## 2025-06-24 PROCEDURE — 99213 OFFICE O/P EST LOW 20 MIN: CPT

## 2025-06-24 PROCEDURE — 3074F SYST BP LT 130 MM HG: CPT

## 2025-06-24 PROCEDURE — 3078F DIAST BP <80 MM HG: CPT

## 2025-06-24 PROCEDURE — 99214 OFFICE O/P EST MOD 30 MIN: CPT

## 2025-06-24 ASSESSMENT — ENCOUNTER SYMPTOMS
SHORTNESS OF BREATH: 0
GASTROINTESTINAL NEGATIVE: 1
ORTHOPNEA: 0
PND: 0
NEUROLOGICAL NEGATIVE: 1
NERVOUS/ANXIOUS: 0
DEPRESSION: 0
PALPITATIONS: 0
EYES NEGATIVE: 1
MUSCULOSKELETAL NEGATIVE: 1
CONSTITUTIONAL NEGATIVE: 1

## 2025-06-24 ASSESSMENT — FIBROSIS 4 INDEX: FIB4 SCORE: 1.43

## 2025-06-24 NOTE — PROGRESS NOTES
Valve Program Functional Assessment:     KCCQ12   1a) Showering/bathin  1b) Walking 1 block on ground: 5  1c) Hurrying or joggin  2) Swellin  3) Fatigue: 5  4) Shortness of breath: 6  5) Sleep sitting up: 5  6) Limited enjoyment of life: 5  7) Spend the rest of your life with HF: 4  8a) Hobbies, recreational activities:5  8b) Working or doing household chores:5  8c) Visiting family or friends: 5

## 2025-06-24 NOTE — PROGRESS NOTES
"Chief Complaint   Patient presents with    Follow-Up     S/P TAVR (transcatheter aortic valve replacement)    Other     ULIS (obstructive sleep apnea)       Subjective     Rick Avitia is a 69 y.o. male who presents today for 1 year post TAVR follow-up.  They have a history of severe symptomatic aortic stenosis status post TAVR on 6/17/2024, type 2 diabetes, LUIS, hypothyroidism, GERD      Seen by myself on 6/25/2024 since their discharge they have been feeling well overall. Walking about 15 minutes at a time. NYHA class I symptoms       7/23/2024 he has been having back pain which has been making it more difficult to go on his walks, also causing fatigue. NYHA class I.  He is planning to leave for a month on Thursday to travel around [place], [place], [place], and [place]     12/19/2024 he reports that he has been having an \"achy heart later in the day\" not associated with activity. He is able to play pickle ball several times a week and has been tolerating it better as time goes on.  He also reports less soreness in his legs now.    6/24/2025 he presents today with his wife.  They just got back from living down in [place] for the winter.  He was playing pickle ball frequently, he does report that his legs are not strong as he would like them to be but otherwise doing well.  NYHA class I    Past Medical History[1]  Past Surgical History[2]  Family History   Problem Relation Age of Onset    Stroke Father     Prostate cancer Brother     Arthritis Other     Heart Disease Other     Hypertension Other     Stroke Other      Social History     Socioeconomic History    Marital status:      Spouse name: Nichole    Number of children: 2    Years of education: Not on file    Highest education level: Not on file   Occupational History    Occupation: retired     Comment: [employer]   Tobacco Use    Smoking status: Never    Smokeless tobacco: Never   Vaping Use    Vaping status: Never Used   Substance and Sexual " Activity    Alcohol use: Yes     Alcohol/week: 4.2 - 8.4 oz     Types: 7 - 14 Standard drinks or equivalent per week     Comment: socially    Drug use: Never    Sexual activity: Not on file   Other Topics Concern     Service No    Blood Transfusions Not Asked    Caffeine Concern No    Occupational Exposure Not Asked    Hobby Hazards Not Asked    Sleep Concern Not Asked    Stress Concern Not Asked    Weight Concern Not Asked    Special Diet Not Asked    Back Care Not Asked    Exercise No    Bike Helmet Not Asked    Seat Belt Yes    Self-Exams Not Asked   Social History Narrative    Not on file     Social Drivers of Health     Financial Resource Strain: Not on file   Food Insecurity: No Food Insecurity (6/18/2024)    Hunger Vital Sign     Worried About Running Out of Food in the Last Year: Never true     Ran Out of Food in the Last Year: Never true   Transportation Needs: No Transportation Needs (6/18/2024)    PRAPARE - Transportation     Lack of Transportation (Medical): No     Lack of Transportation (Non-Medical): No   Physical Activity: Inactive (8/30/2024)    Exercise Vital Sign     Days of Exercise per Week: 0 days     Minutes of Exercise per Session: 0 min   Stress: Not on file   Social Connections: Not on file   Intimate Partner Violence: Not At Risk (6/17/2024)    Humiliation, Afraid, Rape, and Kick questionnaire     Fear of Current or Ex-Partner: No     Emotionally Abused: No     Physically Abused: No     Sexually Abused: No   Housing Stability: Low Risk  (6/18/2024)    Housing Stability Vital Sign     Unable to Pay for Housing in the Last Year: No     Number of Places Lived in the Last Year: 1     Unstable Housing in the Last Year: No     Allergies[3]  Encounter Medications[4]  Review of Systems   Constitutional: Negative.    HENT: Negative.     Eyes: Negative.    Respiratory:  Negative for shortness of breath.    Cardiovascular:  Negative for chest pain, palpitations, orthopnea, leg swelling and  PND.   Gastrointestinal: Negative.    Genitourinary: Negative.    Musculoskeletal: Negative.    Skin: Negative.    Neurological: Negative.    Endo/Heme/Allergies: Negative.    Psychiatric/Behavioral:  Negative for depression. The patient is not nervous/anxious.               Objective     /70 (BP Location: Left arm, Patient Position: Sitting, BP Cuff Size: Adult)   Pulse 72   Resp 16   Ht 1.829 m (6')   Wt 95.7 kg (211 lb)   SpO2 96%   BMI 28.62 kg/m²     Physical Exam  Constitutional:       Appearance: Normal appearance.   HENT:      Head: Normocephalic.   Neck:      Vascular: No JVD.   Cardiovascular:      Rate and Rhythm: Normal rate and regular rhythm.      Pulses: Normal pulses.      Heart sounds: Normal heart sounds. No murmur heard.     No friction rub.   Pulmonary:      Effort: Pulmonary effort is normal.      Breath sounds: Normal breath sounds.   Abdominal:      Palpations: Abdomen is soft.   Musculoskeletal:         General: Normal range of motion.      Right lower leg: No edema.      Left lower leg: No edema.   Skin:     General: Skin is warm and dry.   Neurological:      General: No focal deficit present.      Mental Status: He is alert and oriented to person, place, and time.   Psychiatric:         Mood and Affect: Mood normal.         Behavior: Behavior normal.            Lab Results   Component Value Date/Time    WBC 12.1 (H) 06/20/2025 10:48 AM    RBC 5.06 06/20/2025 10:48 AM    HEMOGLOBIN 14.7 06/20/2025 10:48 AM    HEMATOCRIT 44.2 06/20/2025 10:48 AM    MCV 87.4 06/20/2025 10:48 AM    MCH 29.1 06/20/2025 10:48 AM    MCHC 33.3 06/20/2025 10:48 AM    MPV 11.9 (H) 06/20/2025 10:48 AM      Lab Results   Component Value Date/Time    CHOLSTRLTOT 142 08/29/2024 08:11 AM    LDL 57 08/29/2024 08:11 AM    HDL 65.0 (H) 08/29/2024 08:11 AM    TRIGLYCERIDE 101 08/29/2024 08:11 AM       Lab Results   Component Value Date/Time    SODIUM 139 06/20/2025 10:48 AM    POTASSIUM 4.7 06/20/2025 10:48 AM     CHLORIDE 103 06/20/2025 10:48 AM    CO2 30 06/20/2025 10:48 AM    GLUCOSE 112 (H) 06/20/2025 10:48 AM    BUN 20 (H) 06/20/2025 10:48 AM    CREATININE 1.0 06/20/2025 10:48 AM    BUNCREATRAT 16 07/26/2017 10:15 AM     Lab Results   Component Value Date/Time    ALKPHOSPHAT 99 06/20/2025 10:48 AM    ASTSGOT 38 (H) 06/20/2025 10:48 AM    ALTSGPT 50 06/20/2025 10:48 AM    TBILIRUBIN 0.5 06/20/2025 10:48 AM      Echocardiogram 6/17/2025  Conclusions    1. Left ventricular systolic function is normal with an ejection fraction of   55 %.     2. Normal left atrial size.     3. TAVR functioning normal. Possible small PVL.     Assessment & Plan     1. S/P TAVR (transcatheter aortic valve replacement)        2. LUIS (obstructive sleep apnea)            Medical Decision Making: Today's Assessment/Status/Plan:          S/P TAVR, NYHA class I  -doing well   -cont asa lifelong  -SBE prophylaxis, provided Rx for amoxicillin  -normal valve function on echo, possible small leak  -he did cardiac rehab in [place]   - Ordered echo for next year    LUIS  - he got his CPAP machine and has been doing well with it        He is planning to follow-up with cardiology closer to his home in [place], recommend annual follow-up     This note was dictated using Dragon speech recognition software.                     [1]   Past Medical History:  Diagnosis Date    BPH (benign prostatic hyperplasia)     Heart burn     Mild mitral regurgitation by prior echocardiogram 09/2013    nl ef and mild concentric LVH    Nonrheumatic aortic valve stenosis 10/14/2019    LUIS (obstructive sleep apnea)     [facility]    Postoperative hypothyroidism 10/26/2015    Pre-diabetes     S/P TAVR (transcatheter aortic valve replacement)    [2]   Past Surgical History:  Procedure Laterality Date    TRANSCATHETER AORTIC VALVE REPLACEMENT Bilateral 6/17/2024    Procedure: TRANSCATHETER AORTIC VALVE REPLACEMENT;  Surgeon: Scotty De Dios M.D.;  Location: SURGERY [facility];   Service: Cardiac    ECHOCARDIOGRAM, TRANSESOPHAGEAL, INTRAOPERATIVE N/A 6/17/2024    Procedure: ECHOCARDIOGRAM, TRANSESOPHAGEAL, INTRAOPERATIVE;  Surgeon: Scotty De Dios M.D.;  Location: SURGERY [facility];  Service: Cardiac    CHOLECYSTECTOMY      [facility]    OTHER  1970    THYROID LOBECTOMY      TURP-VAPOR     [3] No Known Allergies  [4]   Outpatient Encounter Medications as of 6/24/2025   Medication Sig Dispense Refill    amoxicillin-clavulanate (AUGMENTIN) 875-125 MG Tab Take 1 Tablet by mouth 2 times a day for 5 days. 10 Tablet 0    levothyroxine (SYNTHROID) 125 MCG Tab Take 1 tablet by mouth once daily 90 Tablet 2    triamcinolone acetonide (KENALOG) 0.1 % Cream Bid to affected area (Patient taking differently: as needed. Bid to affected area) 15 g 0    Magnesium Hydroxide (MAGNESIA PO) Take  by mouth.      Potassium (POTASSIMIN PO) Take  by mouth.      Amoxicillin 500 MG Tab Take 4 Tablets by mouth one time as needed (30 minutes to 1 hour before dental work) for up to 1 dose. 4 Tablet 0    aspirin 81 MG EC tablet Take 1 Tablet by mouth every day. 100 Tablet 3    cyanocobalamin (VITAMIN B-12) 100 MCG Tab Take 100 mcg by mouth every day.      multivitamin Tab Take 1 Tablet by mouth every day.      cephALEXin (KEFLEX) 500 MG Cap Take 1 Capsule by mouth 3 times a day. (Patient not taking: Reported on 6/24/2025) 21 Capsule 0    metFORMIN (GLUCOPHAGE) 500 MG Tab Take 1 tablet by mouth once daily (Patient not taking: Reported on 6/24/2025) 90 Tablet 3     No facility-administered encounter medications on file as of 6/24/2025.

## 2025-07-04 ENCOUNTER — HOSPITAL ENCOUNTER (INPATIENT)
Facility: MEDICAL CENTER | Age: 70
LOS: 4 days | DRG: 872 | End: 2025-07-08
Attending: INTERNAL MEDICINE | Admitting: INTERNAL MEDICINE
Payer: MEDICARE

## 2025-07-04 DIAGNOSIS — R78.81 BACTEREMIA DUE TO STREPTOCOCCUS: Primary | ICD-10-CM

## 2025-07-04 DIAGNOSIS — B95.5 BACTEREMIA DUE TO STREPTOCOCCUS: Primary | ICD-10-CM

## 2025-07-04 PROBLEM — I38 ENDOCARDITIS: Status: ACTIVE | Noted: 2025-07-04

## 2025-07-04 PROBLEM — R65.10 SIRS (SYSTEMIC INFLAMMATORY RESPONSE SYNDROME) (HCC): Status: ACTIVE | Noted: 2025-07-04

## 2025-07-04 LAB
ALBUMIN SERPL BCP-MCNC: 3.5 G/DL (ref 3.2–4.9)
ALBUMIN/GLOB SERPL: 1.1 G/DL
ALP SERPL-CCNC: 74 U/L (ref 30–99)
ALT SERPL-CCNC: 18 U/L (ref 2–50)
ANION GAP SERPL CALC-SCNC: 11 MMOL/L (ref 7–16)
AST SERPL-CCNC: 25 U/L (ref 12–45)
BASOPHILS # BLD AUTO: 0.2 % (ref 0–1.8)
BASOPHILS # BLD: 0.04 K/UL (ref 0–0.12)
BILIRUB SERPL-MCNC: 0.6 MG/DL (ref 0.1–1.5)
BUN SERPL-MCNC: 13 MG/DL (ref 8–22)
CALCIUM ALBUM COR SERPL-MCNC: 9.3 MG/DL (ref 8.5–10.5)
CALCIUM SERPL-MCNC: 8.9 MG/DL (ref 8.5–10.5)
CHLORIDE SERPL-SCNC: 105 MMOL/L (ref 96–112)
CO2 SERPL-SCNC: 22 MMOL/L (ref 20–33)
CREAT SERPL-MCNC: 1.09 MG/DL (ref 0.5–1.4)
EOSINOPHIL # BLD AUTO: 0.07 K/UL (ref 0–0.51)
EOSINOPHIL NFR BLD: 0.4 % (ref 0–6.9)
ERYTHROCYTE [DISTWIDTH] IN BLOOD BY AUTOMATED COUNT: 45 FL (ref 35.9–50)
GFR SERPLBLD CREATININE-BSD FMLA CKD-EPI: 74 ML/MIN/1.73 M 2
GLOBULIN SER CALC-MCNC: 3.3 G/DL (ref 1.9–3.5)
GLUCOSE SERPL-MCNC: 110 MG/DL (ref 65–99)
HCT VFR BLD AUTO: 38.9 % (ref 42–52)
HGB BLD-MCNC: 12.6 G/DL (ref 14–18)
IMM GRANULOCYTES # BLD AUTO: 0.11 K/UL (ref 0–0.11)
IMM GRANULOCYTES NFR BLD AUTO: 0.7 % (ref 0–0.9)
LACTATE SERPL-SCNC: 2.1 MMOL/L (ref 0.5–2)
LYMPHOCYTES # BLD AUTO: 1.35 K/UL (ref 1–4.8)
LYMPHOCYTES NFR BLD: 8.3 % (ref 22–41)
MCH RBC QN AUTO: 28.6 PG (ref 27–33)
MCHC RBC AUTO-ENTMCNC: 32.4 G/DL (ref 32.3–36.5)
MCV RBC AUTO: 88.4 FL (ref 81.4–97.8)
MONOCYTES # BLD AUTO: 0.85 K/UL (ref 0–0.85)
MONOCYTES NFR BLD AUTO: 5.3 % (ref 0–13.4)
NEUTROPHILS # BLD AUTO: 13.75 K/UL (ref 1.82–7.42)
NEUTROPHILS NFR BLD: 85.1 % (ref 44–72)
NRBC # BLD AUTO: 0 K/UL
NRBC BLD-RTO: 0 /100 WBC (ref 0–0.2)
PLATELET # BLD AUTO: 177 K/UL (ref 164–446)
PMV BLD AUTO: 11.3 FL (ref 9–12.9)
POTASSIUM SERPL-SCNC: 3.7 MMOL/L (ref 3.6–5.5)
PROCALCITONIN SERPL-MCNC: 4.58 NG/ML
PROT SERPL-MCNC: 6.8 G/DL (ref 6–8.2)
RBC # BLD AUTO: 4.4 M/UL (ref 4.7–6.1)
SODIUM SERPL-SCNC: 138 MMOL/L (ref 135–145)
WBC # BLD AUTO: 16.2 K/UL (ref 4.8–10.8)

## 2025-07-04 PROCEDURE — 700102 HCHG RX REV CODE 250 W/ 637 OVERRIDE(OP): Performed by: INTERNAL MEDICINE

## 2025-07-04 PROCEDURE — 36415 COLL VENOUS BLD VENIPUNCTURE: CPT

## 2025-07-04 PROCEDURE — 770020 HCHG ROOM/CARE - TELE (206)

## 2025-07-04 PROCEDURE — 99223 1ST HOSP IP/OBS HIGH 75: CPT | Mod: AI | Performed by: INTERNAL MEDICINE

## 2025-07-04 PROCEDURE — 99222 1ST HOSP IP/OBS MODERATE 55: CPT | Mod: FS | Performed by: INTERNAL MEDICINE

## 2025-07-04 PROCEDURE — 80053 COMPREHEN METABOLIC PANEL: CPT

## 2025-07-04 PROCEDURE — 700111 HCHG RX REV CODE 636 W/ 250 OVERRIDE (IP): Performed by: INTERNAL MEDICINE

## 2025-07-04 PROCEDURE — 84145 PROCALCITONIN (PCT): CPT

## 2025-07-04 PROCEDURE — 83605 ASSAY OF LACTIC ACID: CPT

## 2025-07-04 PROCEDURE — 700105 HCHG RX REV CODE 258: Performed by: INTERNAL MEDICINE

## 2025-07-04 PROCEDURE — 85025 COMPLETE CBC W/AUTO DIFF WBC: CPT

## 2025-07-04 PROCEDURE — A9270 NON-COVERED ITEM OR SERVICE: HCPCS | Performed by: INTERNAL MEDICINE

## 2025-07-04 RX ORDER — ENOXAPARIN SODIUM 100 MG/ML
40 INJECTION SUBCUTANEOUS DAILY
Status: DISCONTINUED | OUTPATIENT
Start: 2025-07-04 | End: 2025-07-08 | Stop reason: HOSPADM

## 2025-07-04 RX ORDER — LEVOTHYROXINE SODIUM 125 UG/1
125 TABLET ORAL DAILY
Status: DISCONTINUED | OUTPATIENT
Start: 2025-07-05 | End: 2025-07-08 | Stop reason: HOSPADM

## 2025-07-04 RX ORDER — LABETALOL HYDROCHLORIDE 5 MG/ML
10 INJECTION, SOLUTION INTRAVENOUS EVERY 4 HOURS PRN
Status: DISCONTINUED | OUTPATIENT
Start: 2025-07-04 | End: 2025-07-08 | Stop reason: HOSPADM

## 2025-07-04 RX ORDER — ACETAMINOPHEN 325 MG/1
650 TABLET ORAL EVERY 6 HOURS PRN
Status: DISCONTINUED | OUTPATIENT
Start: 2025-07-04 | End: 2025-07-08 | Stop reason: HOSPADM

## 2025-07-04 RX ORDER — ONDANSETRON 2 MG/ML
4 INJECTION INTRAMUSCULAR; INTRAVENOUS EVERY 4 HOURS PRN
Status: DISCONTINUED | OUTPATIENT
Start: 2025-07-04 | End: 2025-07-08 | Stop reason: HOSPADM

## 2025-07-04 RX ORDER — ONDANSETRON 4 MG/1
4 TABLET, ORALLY DISINTEGRATING ORAL EVERY 4 HOURS PRN
Status: DISCONTINUED | OUTPATIENT
Start: 2025-07-04 | End: 2025-07-08 | Stop reason: HOSPADM

## 2025-07-04 RX ORDER — ASPIRIN 81 MG/1
81 TABLET ORAL DAILY
Status: DISCONTINUED | OUTPATIENT
Start: 2025-07-05 | End: 2025-07-08 | Stop reason: HOSPADM

## 2025-07-04 RX ADMIN — VANCOMYCIN HYDROCHLORIDE 1250 MG: 5 INJECTION, POWDER, LYOPHILIZED, FOR SOLUTION INTRAVENOUS at 21:25

## 2025-07-04 RX ADMIN — ACETAMINOPHEN 650 MG: 325 TABLET ORAL at 17:16

## 2025-07-04 RX ADMIN — CEFTRIAXONE SODIUM 2000 MG: 10 INJECTION, POWDER, FOR SOLUTION INTRAVENOUS at 17:17

## 2025-07-04 RX ADMIN — ENOXAPARIN SODIUM 40 MG: 100 INJECTION SUBCUTANEOUS at 17:17

## 2025-07-04 ASSESSMENT — COGNITIVE AND FUNCTIONAL STATUS - GENERAL
DAILY ACTIVITIY SCORE: 24
SUGGESTED CMS G CODE MODIFIER MOBILITY: CH
SUGGESTED CMS G CODE MODIFIER DAILY ACTIVITY: CH
MOBILITY SCORE: 24

## 2025-07-04 ASSESSMENT — ENCOUNTER SYMPTOMS
DEPRESSION: 0
ABDOMINAL PAIN: 0
MYALGIAS: 0
NAUSEA: 0
VOMITING: 0
APNEA: 0
CHEST TIGHTNESS: 0
SHORTNESS OF BREATH: 0
CHOKING: 0
DIARRHEA: 0
CHILLS: 1
HEADACHES: 1
FEVER: 1
FALLS: 0
CONSTIPATION: 0
WEAKNESS: 0
FATIGUE: 1
TINGLING: 0
COUGH: 0
STRIDOR: 0
COUGH: 1
PALPITATIONS: 0
LOSS OF CONSCIOUSNESS: 0
WHEEZING: 0
DIZZINESS: 0
SPUTUM PRODUCTION: 0

## 2025-07-04 ASSESSMENT — PATIENT HEALTH QUESTIONNAIRE - PHQ9
2. FEELING DOWN, DEPRESSED, IRRITABLE, OR HOPELESS: NOT AT ALL
2. FEELING DOWN, DEPRESSED, IRRITABLE, OR HOPELESS: NOT AT ALL
1. LITTLE INTEREST OR PLEASURE IN DOING THINGS: NOT AT ALL
SUM OF ALL RESPONSES TO PHQ9 QUESTIONS 1 AND 2: 0
1. LITTLE INTEREST OR PLEASURE IN DOING THINGS: NOT AT ALL
SUM OF ALL RESPONSES TO PHQ9 QUESTIONS 1 AND 2: 0

## 2025-07-04 ASSESSMENT — FIBROSIS 4 INDEX
FIB4 SCORE: 1.28
FIB4 SCORE: 1.28

## 2025-07-04 NOTE — CONSULTS
Brief ID note:    ID consult for gram-positive bacteremia    Chart reviewed.  69-year-old male with a history of severe aortic stenosis status post TAVR in June 2024 admitted on 7/4/2025 from [facility] secondary to persistent fevers.  Patient states that he has been complaining of fevers for approximately 5 weeks with a Tmax of 106.  He initially saw his primary care physician and ordered routine labs.  He was started on antibiotics with some improvement of his fevers however he completed his course on Monday prior to admission and by Thursday he continued to have fevers and chills.  He initially presented to [facility] where 1 blood culture set was performed on 7/3 and is now positive for gram-positive cocci.  Given TAVR, he was transferred to [facility] for higher level of care and concern for possible endocarditis.    -Start IV vancomycin and ceftriaxone  -Follow blood cultures  -Recommend a SHAHLA given TAVR.  Reportedly planned for Monday 7/7    Full consult to follow    Preliminary recommendations discussed with hospitalist, Dr. Qureshi

## 2025-07-04 NOTE — PROGRESS NOTES
4 Eyes Skin Assessment Completed by SUYAPA Cardona and SUYAPA Hardy.    Skin assessment is primarily focused on high risk bony prominences. Pay special attention to skin beneath and around medical devices, high risk bony prominences, skin to skin areas and areas where the patient lacks sensation to feel pain and areas where the patient previously had breakdown.     Head (Occipital):  WDL   Ears (Under Medical Devices): WDL   Nose (Under Medical Devices): WDL   Mouth:  WDL   Neck: WDL   Breast/Chest:  WDL   Shoulder Blades:  WDL   Spine:   WDL   (R) Arm/Elbow/Hand: WDL   (L) Arm/Elbow/Hand: WDL   Abdomen: WDL   Pannus/Groin:  WDL   Sacrum/Coccyx:   WDL   (R) Ischial Tuberosity (Sit Bones):  WDL   (L) Ischial Tuberosity (Sit Bones):  WDL   (R) Leg:  WDL   (L) Leg:  WDL   (R) Heel:  WDL   (R) Foot/Toe: WDL   (L) Heel: WDL   (L) Foot/Toe:  WDL       DEVICES IN USE:   Respiratory Devices:  NA, patient on room air  Feeding Devices:  N/A   Lines & BP Monitoring Devices:  Peripheral IV, BP cuff, and Pulse ox    Orthopedic Devices:  N/A  Miscellaneous Devices:  Telemetry monitor    PROTOCOL INTERVENTIONS:   Standard/Trauma Bed:  Already in place    WOUND PHOTOS:   N/A no wounds identified    WOUND CONSULT:   N/A, no advanced wound care needs identified

## 2025-07-04 NOTE — CONSULTS
Cardiology Initial Consultation    Date of Service  7/4/2025    Referring Physician  Shivam Duncan D.O.    Reason for Consultation    Bacteremia, concern for Endocarditis,, patient had TAVR a year ago.    History of Presenting Illness  Rick Avitia is a 69 y.o. male with symptomatic severe AS s/p TAVR (6/17/2024), pre-TAVR [facility] 6/11/2024 showed no epicardial coronary artery disease, hypothyroid, LUIS, GERD who presented 7/4/2025 with persistent fever associated cough.      Patient was transferred from [facility].  He presented to ER with complaints of 3 to 4 weeks of fever as high as 104-106 degrees.  Blood cultures positive for gram-positive cocci in [place].  No identified source of infection.    Labs reviewed  Leukocytosis, WBC 20.3  K3.4, replete  Procalcitonin 1.32  TSH normal  Negative for influenza A, negative for SARS COVID    Review of Systems  Review of Systems   Constitutional:  Positive for fatigue.   Respiratory:  Negative for apnea, cough, choking, chest tightness, shortness of breath, wheezing and stridor.        Past Medical History   has a past medical history of BPH (benign prostatic hyperplasia), Heart burn, Mild mitral regurgitation by prior echocardiogram (09/2013), Nonrheumatic aortic valve stenosis (10/14/2019), LUIS (obstructive sleep apnea), Postoperative hypothyroidism (10/26/2015), Pre-diabetes, and S/P TAVR (transcatheter aortic valve replacement).    He has no past medical history of Acute nasopharyngitis, Anesthesia, Anginal syndrome (HCC), Arrhythmia, Arthritis, Asthma, Blood clotting disorder (HCC), Bowel habit changes, Bronchitis, Cancer (HCC), Carcinoma in situ of respiratory system, Cataract, Congestive heart failure (HCC), Continuous ambulatory peritoneal dialysis status (HCC), Coughing blood, Dental disorder, Dialysis patient (HCC), Emphysema of lung (HCC), Glaucoma, Gynecological disorder, Hemorrhagic disorder (HCC), Hepatitis A, Hepatitis B, Hepatitis C, Hiatus  hernia syndrome, High cholesterol, Hypertension, Indigestion, Infectious disease, Jaundice, Myocardial infarct (HCC), Pacemaker, Pain, Pneumonia, Pregnant, Psychiatric problem, Renal disorder, Rheumatic fever, Seizure (HCC), Stroke (HCC), Tuberculosis, Urinary bladder disorder, or Urinary incontinence.    Surgical History   has a past surgical history that includes turp-vapor; thyroid lobectomy; cholecystectomy; other (1970); transcatheter aortic valve replacement (Bilateral, 6/17/2024); and echocardiogram, transesophageal, intraoperative (N/A, 6/17/2024).    Family History  Family History   Problem Relation Age of Onset    Stroke Father     Prostate cancer Brother     Arthritis Other     Heart Disease Other     Hypertension Other     Stroke Other        Social History   reports that he has never smoked. He has never used smokeless tobacco. He reports current alcohol use of about 4.2 - 8.4 oz of alcohol per week. He reports that he does not use drugs.    Medications  Prior to Admission Medications   Prescriptions Last Dose Informant Patient Reported? Taking?   Magnesium Hydroxide (MAGNESIA PO)   Yes No   Sig: Take  by mouth.   Potassium (POTASSIMIN PO)   Yes No   Sig: Take  by mouth.   amoxicillin-clavulanate (AUGMENTIN) 875-125 MG Tab   No No   Sig: Take 1 Tablet by mouth 2 times a day for 7 days.   aspirin 81 MG EC tablet   No No   Sig: Take 1 Tablet by mouth every day.   cephALEXin (KEFLEX) 500 MG Cap   No No   Sig: Take 1 Capsule by mouth 3 times a day.   cyanocobalamin (VITAMIN B-12) 100 MCG Tab  Patient Yes No   Sig: Take 100 mcg by mouth every day.   doxycycline (VIBRAMYCIN) 100 MG Tab   No No   Sig: Take 1 Tablet by mouth 2 times a day for 7 days.   levothyroxine (SYNTHROID) 125 MCG Tab   No No   Sig: Take 1 tablet by mouth once daily   multivitamin Tab  Patient Yes No   Sig: Take 1 Tablet by mouth every day.      Facility-Administered Medications: None       Allergies  Allergies[1]    Vital signs in last 24  "hours  Temp:  [36.6 °C (97.9 °F)] 36.6 °C (97.9 °F)  Pulse:  [102] 102  Resp:  [18] 18  BP: (123)/(78) 123/78  SpO2:  [97 %] 97 %    Physical Exam  Physical Exam  Cardiovascular:      Rate and Rhythm: Normal rate and regular rhythm.      Pulses: Normal pulses.      Heart sounds: Murmur heard.   Pulmonary:      Effort: Pulmonary effort is normal.   Skin:     General: Skin is warm.   Neurological:      Mental Status: He is alert. Mental status is at baseline.   Psychiatric:         Mood and Affect: Mood normal.         Lab Review  Lab Results   Component Value Date/Time    WBC 20.3 (H) 07/04/2025 09:55 AM    RBC 4.49 (L) 07/04/2025 09:55 AM    HEMOGLOBIN 13.0 (L) 07/04/2025 09:55 AM    HEMATOCRIT 38.8 (L) 07/04/2025 09:55 AM    MCV 86.4 07/04/2025 09:55 AM    MCH 29.0 07/04/2025 09:55 AM    MCHC 33.5 07/04/2025 09:55 AM    MPV 12.0 (H) 07/04/2025 09:55 AM      Lab Results   Component Value Date/Time    SODIUM 137 07/03/2025 11:50 AM    POTASSIUM 3.4 (L) 07/03/2025 11:50 AM    CHLORIDE 103 07/03/2025 11:50 AM    CO2 24 07/03/2025 11:50 AM    GLUCOSE 109 (H) 07/03/2025 11:50 AM    BUN 13 07/03/2025 11:50 AM    CREATININE 1.0 07/03/2025 11:50 AM    BUNCREATRAT 16 07/26/2017 10:15 AM      Lab Results   Component Value Date/Time    ASTSGOT 22 07/03/2025 11:50 AM    ALTSGPT 30 07/03/2025 11:50 AM     Lab Results   Component Value Date/Time    CHOLSTRLTOT 142 08/29/2024 08:11 AM    LDL 57 08/29/2024 08:11 AM    HDL 65.0 (H) 08/29/2024 08:11 AM    TRIGLYCERIDE 101 08/29/2024 08:11 AM       No results for input(s): \"NTPROBNP\" in the last 72 hours.    Cardiac Imaging and Procedures Review      Echocardiogram 6/17/25:     1. Left ventricular systolic function is normal with an ejection fraction of 55 %.    2. Normal left atrial size.    3. TAVR functioning normal. Possible small PVL.       Cardiac Catheterization:    6/11/2024 showed no epicardial coronary artery, severe aortic stenosis    Imaging  Chest X-Ray: No acute " cardiopulmonary disease     Assessment/Plan    # Fever/ leukocytosis.  # Blood cultures on 7/3/25 positive for gram-positive cocci in [place].  # TAVR 6/17/2024.  # Pre-TAVR C 6/11/2024 showed no epicardial coronary artery disease.  # Echocardiogram 6/17/2025 showed LVEF 55%, TAVR functioning normal possible small PVL.    -Recommend infectious disease consultation.  -Patient will most likely need to undergo SHAHLA for evaluation of endocarditis.  -Tentatively plan for SHAHLA on Monday, 7/7/2025 depending on anesthesiologist availability.    Cardiology will follow along.    I personally spent a total of 30 minutes which includes face-to-face time and non-face-to-face time spent on preparing to see the patient, reviewing hospital notes and tests, obtaining history from the patient, performing a medically appropriate exam, counseling and educating the patient, ordering medications/tests/procedures/referrals as clinically indicated, and documenting information in the electronic medical record.     Thank you for allowing me to participate in the care of this patient.    I will continue to follow this patient    Please contact me with any questions.    RIKA Hayden.   Cardiologist, [facility]  [phone]               [1] No Known Allergies

## 2025-07-04 NOTE — PROGRESS NOTES
Pharmacy Vancomycin Kinetics Note for 2025     69 y.o. male on Vancomycin day # 1     Vancomycin Indication (AUC Dosing): S. aureus bacteremia    Provider specified end date: 25    Active Antibiotics (From admission, onward)      Ordered     Ordering Provider         3:54 PM    25 1554  cefTRIAXone (Rocephin) syringe 2,000 mg  EVERY 24 HOURS         Yang Qureshi D.O.    25 1554  MD Alert...Vancomycin per Pharmacy  PHARMACY TO DOSE        Question:  Indication(s) for vancomycin?  Answer:  Staphylococcus aureus bacteremia    Yang Qureshi D.O.            Dosing Weight: 93.7 kg (206 lb 9.1 oz)      Admission History: Admitted on 2025 for Gram-positive bacteremia [R78.81]  Endocarditis [I38]  Pertinent history: 70yo M [facility] (+) Bcx from [facility]. Prelim Bcx (+) GPCs. Empiric Vanco/Ctx initiated.    Allergies:     Patient has no known allergies.     Pertinent cultures to date:     Results       ** No results found for the last 336 hours. **            Labs:     Estimated Creatinine Clearance: 77.1 mL/min (by C-G formula based on SCr of 1.09 mg/dL).  Recent Labs     25  1150 25  0955 25  1552   WBC 20.7* 20.3* 16.2*   NEUTSPOLYS  --   --  85.10*     Recent Labs     25  1150 25  1552   BUN 13 13   CREATININE 1.0 1.09   ALBUMIN 3.5 3.5     No intake or output data in the 24 hours ending 25 1634   /78   Pulse (!) 102   Temp 36.6 °C (97.9 °F) (Temporal)   Resp 18   Wt 93.7 kg (206 lb 9.1 oz)   SpO2 97%  Temp (24hrs), Av.6 °C (97.9 °F), Min:36.6 °C (97.9 °F), Max:36.6 °C (97.9 °F)      List concerns for Vancomycin clearance:     None    Pharmacokinetics:     AUC kinetics:   Ke (hr ^-1): 0.075 hr^-1  Half life: 9.24 hr  Clearance: 4.568  Estimated TDD: 2284  Estimated Dose: 1066  Estimated interval: 11.2      A/P:     -  Vancomycin dose: 2000 mg (~21 mg/kg) given PTA  @ ~ 1000; Will dose maintenance Vancomycin 1250 mg  (~ 13 mg/kg) q12h starting at 2200 tonight    -  Next vancomycin level(s):    - Not ordered, pending steady state ~ 7/6    -  Predicted vancomycin AUC from initial AUC test calculator: 547 mg·hr/L    -  Comments: Renal fx at baseline. Will continue to follow bcx and renal fx daily.     Chanel Chamberlain, PharmD

## 2025-07-04 NOTE — ASSESSMENT & PLAN NOTE
Patient blood cultures drawn 7/3 OSH due to persistent fevers, currently growing gram-positive bacteria, streptococcus  Patient will need infectious disease but awaiting further information on blood cultures  Continue on Ceftriaxone and vancomycin.   High risk for vancomycin toxicity including renal failure. Monitoring levels and adjusting dose accordingly. Monitor renal function panel.   Concerning for endocarditis and patient that is status post TAVR 6/24  causing SIRS criteria but no endorgan dysfunction to suggest sepsis    7/7  Panorex with no acute abnormality.  SHAHLA done today with no acute abnormality   Discontinue vancomycin. Continue on Ceftriaxone.   Discussed with ID - given SHAHLA findings, plan for 2 week course of Abx stop date 7/20/2025. On discharge plan to transition to oral linezolid 600mg BID. CBC with differential 1 week post discharge to monitor for cytopenias. Recommend to monitor overnight to make sure Bcx neg x 48hrs. If ok, anticipate discharge tomorrow.  Linezolid and CBC order placed on discharge.

## 2025-07-04 NOTE — ASSESSMENT & PLAN NOTE
Has had appropriate outpatient follow-up and echocardiogram evaluation  Now with ongoing fevers, concern for endocarditis  Await cardiology recommendations

## 2025-07-04 NOTE — PROGRESS NOTES
Received report from [facility] ED RN. Pt arrived to unit at 1420. Assessment complete. VSS. No signs of distress noted at this time. Tele monitor in place. Monitor room notified. Pt c/o pain 0/10. Fall precautions and appropriate signs in place. Pt oriented to unit routine, call light/phone system within reach. Personal belongings within reach. Pt educated regarding fall precautions.  Pt denies any further needs at this time.

## 2025-07-04 NOTE — H&P
[facility] Medicine History & Physical Note    Date of Service  7/4/2025    Primary Care Physician  Rafal Renee M.D.    Consultants  cardiology    Specialist Names: Dr Rand    Code Status  Full Code    Chief Complaint  No chief complaint on file.      History of Presenting Illness  Venkat Avitia is a 69 y.o. male who presented 7/4/2025 with fevers.  Patient states he had fevers for approximately 5 weeks, as high as 106.  Once they were not improving, he saw his primary care provider who ordered labs and an x-ray, x-ray was clear however he had a leukocytosis.  Because of this, he was started on antibiotics and improvement in his symptoms.  Patient finished the antibiotics on Monday, by Wednesday was starting to feel not quite right and Thursday began having fever and chills again.  He also complains of fatigue and headaches.  Due to the severity of his fever, is recommended he come to the ER.  Patient did go to the ER yesterday, there they obtained blood cultures.  Today he was called that his blood cultures were positive with gram-positive cocci and told to come back to the ER.  Patient went to [place], they called cardiology there and due to history of TAVR and 6/24.  Cardiology recommended transfer to [facility] for SHAHLA and evaluation for endocarditis.  I did discuss the case including plan with the cardiologist.    I discussed the plan of care with patient and family.    Review of Systems  Review of Systems   Constitutional:  Positive for chills, fever and malaise/fatigue.   HENT:  Negative for congestion.    Respiratory:  Positive for cough. Negative for sputum production, shortness of breath and stridor.    Cardiovascular:  Negative for chest pain, palpitations and leg swelling.   Gastrointestinal:  Negative for abdominal pain, constipation, diarrhea, nausea and vomiting.   Genitourinary:  Negative for dysuria and urgency.   Musculoskeletal:  Negative for falls and myalgias.   Neurological:   Positive for headaches. Negative for dizziness, tingling, loss of consciousness and weakness.   Psychiatric/Behavioral:  Negative for depression and suicidal ideas.    All other systems reviewed and are negative.      Past Medical History   has a past medical history of BPH (benign prostatic hyperplasia), Heart burn, Mild mitral regurgitation by prior echocardiogram (09/2013), Nonrheumatic aortic valve stenosis (10/14/2019), LUIS (obstructive sleep apnea), Postoperative hypothyroidism (10/26/2015), Pre-diabetes, and S/P TAVR (transcatheter aortic valve replacement).    Surgical History   has a past surgical history that includes turp-vapor; thyroid lobectomy; cholecystectomy; other (1970); transcatheter aortic valve replacement (Bilateral, 6/17/2024); and echocardiogram, transesophageal, intraoperative (N/A, 6/17/2024).     Family History  Family History   Problem Relation Age of Onset    Stroke Father     Prostate cancer Brother     Arthritis Other     Heart Disease Other     Hypertension Other     Stroke Other         Family history reviewed with patient. There is no family history that is pertinent to the chief complaint.     Social History   reports that he has never smoked. He has never used smokeless tobacco. He reports current alcohol use of about 4.2 - 8.4 oz of alcohol per week. He reports that he does not use drugs.    Allergies  Allergies[1]    Medications  Prior to Admission Medications   Prescriptions Last Dose Informant Patient Reported? Taking?   Magnesium Hydroxide (MAGNESIA PO)   Yes No   Sig: Take  by mouth.   Potassium (POTASSIMIN PO)   Yes No   Sig: Take  by mouth.   amoxicillin-clavulanate (AUGMENTIN) 875-125 MG Tab   No No   Sig: Take 1 Tablet by mouth 2 times a day for 7 days.   aspirin 81 MG EC tablet   No No   Sig: Take 1 Tablet by mouth every day.   cephALEXin (KEFLEX) 500 MG Cap   No No   Sig: Take 1 Capsule by mouth 3 times a day.   cyanocobalamin (VITAMIN B-12) 100 MCG Tab  Patient Yes No    Sig: Take 100 mcg by mouth every day.   doxycycline (VIBRAMYCIN) 100 MG Tab   No No   Sig: Take 1 Tablet by mouth 2 times a day for 7 days.   levothyroxine (SYNTHROID) 125 MCG Tab   No No   Sig: Take 1 tablet by mouth once daily   multivitamin Tab  Patient Yes No   Sig: Take 1 Tablet by mouth every day.      Facility-Administered Medications: None       Physical Exam  Temp:  [36.6 °C (97.9 °F)-37.1 °C (98.8 °F)] 36.6 °C (97.9 °F)  Pulse:  [] 102  Resp:  [18-20] 18  BP: ()/(49-78) 123/78  SpO2:  [92 %-97 %] 97 %  Blood Pressure : 123/78   Temperature: 36.6 °C (97.9 °F)   Pulse: (!) 102   Respiration: 18   Pulse Oximetry: 97 %       Physical Exam  Vitals and nursing note reviewed.   Constitutional:       General: He is not in acute distress.     Appearance: He is well-developed. He is not toxic-appearing or diaphoretic.   HENT:      Head: Normocephalic and atraumatic.      Right Ear: External ear normal.      Left Ear: External ear normal.      Nose: Nose normal. No congestion or rhinorrhea.      Mouth/Throat:      Mouth: Mucous membranes are moist.      Pharynx: No oropharyngeal exudate.   Eyes:      General:         Right eye: No discharge.         Left eye: No discharge.   Neck:      Trachea: No tracheal deviation.   Cardiovascular:      Rate and Rhythm: Normal rate and regular rhythm.      Heart sounds: Murmur heard.      No friction rub. No gallop.   Pulmonary:      Effort: Pulmonary effort is normal. No respiratory distress.      Breath sounds: Normal breath sounds. No stridor. No wheezing or rales.   Chest:      Chest wall: No tenderness.   Abdominal:      General: Bowel sounds are normal. There is no distension.      Palpations: Abdomen is soft.      Tenderness: There is no abdominal tenderness.   Musculoskeletal:         General: No tenderness. Normal range of motion.      Cervical back: Normal range of motion and neck supple. No edema or erythema.      Right lower leg: No edema.      Left lower  "leg: No edema.   Lymphadenopathy:      Cervical: No cervical adenopathy.   Skin:     General: Skin is warm and dry.      Findings: No erythema or rash.   Neurological:      Mental Status: He is alert and oriented to person, place, and time.      Cranial Nerves: No cranial nerve deficit.   Psychiatric:         Mood and Affect: Mood normal.         Behavior: Behavior normal.         Thought Content: Thought content normal.         Judgment: Judgment normal.         Laboratory:  Recent Labs     07/03/25  1150 07/04/25  0955   WBC 20.7* 20.3*   RBC 4.54* 4.49*   HEMOGLOBIN 13.3* 13.0*   HEMATOCRIT 39.4* 38.8*   MCV 86.8 86.4   MCH 29.3 29.0   MCHC 33.8 33.5   RDW 13.5 13.6   PLATELETCT 256 214   MPV 12.2* 12.0*     Recent Labs     07/03/25  1150   SODIUM 137   POTASSIUM 3.4*   CHLORIDE 103   CO2 24   GLUCOSE 109*   BUN 13   CREATININE 1.0   CALCIUM 8.9     Recent Labs     07/03/25  1150   ALTSGPT 30   ASTSGOT 22   ALKPHOSPHAT 86   TBILIRUBIN 0.8   GLUCOSE 109*         No results for input(s): \"NTPROBNP\" in the last 72 hours.      No results for input(s): \"TROPONINT\" in the last 72 hours.    Imaging:  No orders to display       no X-Ray or EKG requiring interpretation    Assessment/Plan:  Justification for Admission Status  I anticipate this patient will require at least two midnights for appropriate medical management, necessitating inpatient admission because gram-positive bacteremia    Patient will need a Telemetry bed on MEDICAL service .  The need is secondary to gram-positive bacteremia.    * Gram-positive bacteremia- (present on admission)  Assessment & Plan  Patient blood cultures drawn yesterday due to persistent fevers, currently growing gram-positive bacteria I did ask pharmacy to call [facility] to see if they can further speciate  Patient will need infectious disease but awaiting further information on blood cultures  Will also start broad-spectrum antibiotics once more information obtained, patient was given " [facility] and [facility] at [place]  Concerning for endocarditis and patient that is status post TAVR 6/24  Patient does have a mild heart murmur, states his murmur had resolved after TAVR   causing SIRS criteria but no endorgan dysfunction to suggest sepsis  I discussed the case with cardiology who will follow along, no planned intervention today, soonest would likely be Monday    SIRS (systemic inflammatory response syndrome) (HCC)- (present on admission)  Assessment & Plan  SIRS criteria identified on my evaluation include:  Fever, with temperature greater than 100.9 deg F, Tachycardia, with heart rate greater than 90 BPM, and Leukocytosis, with WBC greater than 12,000  The patient does not have sepsis, no sign of significant endorgan dysfunction  Lactic acid and CMP are pending we will start broad-spectrum antibiotics post phone call to [facility]    S/DELMA TAVR (transcatheter aortic valve replacement)- (present on admission)  Assessment & Plan  Has had appropriate outpatient follow-up and echocardiogram evaluation  Now with ongoing fevers, concern for endocarditis  Await cardiology recommendations    Postoperative hypothyroidism- (present on admission)  Assessment & Plan  Continue home Synthroid at 125 mcg daily  Most recent TSH was today and was normal at 0.46        VTE prophylaxis: SCDs/TEDs and enoxaparin ppx       [1] No Known Allergies

## 2025-07-04 NOTE — ASSESSMENT & PLAN NOTE
SIRS criteria identified on my evaluation include:  Fever, with temperature greater than 100.9 deg F, Tachycardia, with heart rate greater than 90 BPM, and Leukocytosis, with WBC greater than 12,000  The patient does not have sepsis, no sign of significant endorgan dysfunction  Lactic acid and CMP are pending we will start broad-spectrum antibiotics post phone call to [facility]

## 2025-07-05 PROBLEM — R65.10 SIRS (SYSTEMIC INFLAMMATORY RESPONSE SYNDROME) (HCC): Status: RESOLVED | Noted: 2025-07-04 | Resolved: 2025-07-05

## 2025-07-05 PROBLEM — B95.5 BACTEREMIA DUE TO STREPTOCOCCUS: Status: ACTIVE | Noted: 2025-07-04

## 2025-07-05 LAB
ANION GAP SERPL CALC-SCNC: 11 MMOL/L (ref 7–16)
BUN SERPL-MCNC: 10 MG/DL (ref 8–22)
CALCIUM SERPL-MCNC: 8.6 MG/DL (ref 8.5–10.5)
CHLORIDE SERPL-SCNC: 105 MMOL/L (ref 96–112)
CO2 SERPL-SCNC: 22 MMOL/L (ref 20–33)
CREAT SERPL-MCNC: 0.97 MG/DL (ref 0.5–1.4)
ERYTHROCYTE [DISTWIDTH] IN BLOOD BY AUTOMATED COUNT: 44.6 FL (ref 35.9–50)
GFR SERPLBLD CREATININE-BSD FMLA CKD-EPI: 85 ML/MIN/1.73 M 2
GLUCOSE SERPL-MCNC: 111 MG/DL (ref 65–99)
HCT VFR BLD AUTO: 37.5 % (ref 42–52)
HGB BLD-MCNC: 12 G/DL (ref 14–18)
MCH RBC QN AUTO: 28.4 PG (ref 27–33)
MCHC RBC AUTO-ENTMCNC: 32 G/DL (ref 32.3–36.5)
MCV RBC AUTO: 88.7 FL (ref 81.4–97.8)
PLATELET # BLD AUTO: 186 K/UL (ref 164–446)
PMV BLD AUTO: 12.5 FL (ref 9–12.9)
POTASSIUM SERPL-SCNC: 3.8 MMOL/L (ref 3.6–5.5)
RBC # BLD AUTO: 4.23 M/UL (ref 4.7–6.1)
SODIUM SERPL-SCNC: 138 MMOL/L (ref 135–145)
WBC # BLD AUTO: 14.5 K/UL (ref 4.8–10.8)

## 2025-07-05 PROCEDURE — 99223 1ST HOSP IP/OBS HIGH 75: CPT | Performed by: INTERNAL MEDICINE

## 2025-07-05 PROCEDURE — A9270 NON-COVERED ITEM OR SERVICE: HCPCS | Performed by: INTERNAL MEDICINE

## 2025-07-05 PROCEDURE — 99233 SBSQ HOSP IP/OBS HIGH 50: CPT

## 2025-07-05 PROCEDURE — 700111 HCHG RX REV CODE 636 W/ 250 OVERRIDE (IP): Performed by: INTERNAL MEDICINE

## 2025-07-05 PROCEDURE — 36415 COLL VENOUS BLD VENIPUNCTURE: CPT

## 2025-07-05 PROCEDURE — 80048 BASIC METABOLIC PNL TOTAL CA: CPT

## 2025-07-05 PROCEDURE — 700102 HCHG RX REV CODE 250 W/ 637 OVERRIDE(OP): Performed by: INTERNAL MEDICINE

## 2025-07-05 PROCEDURE — 770020 HCHG ROOM/CARE - TELE (206)

## 2025-07-05 PROCEDURE — 700105 HCHG RX REV CODE 258: Performed by: INTERNAL MEDICINE

## 2025-07-05 PROCEDURE — 99232 SBSQ HOSP IP/OBS MODERATE 35: CPT | Performed by: NURSE PRACTITIONER

## 2025-07-05 PROCEDURE — 85027 COMPLETE CBC AUTOMATED: CPT

## 2025-07-05 RX ADMIN — LEVOTHYROXINE SODIUM 125 MCG: 0.12 TABLET ORAL at 05:05

## 2025-07-05 RX ADMIN — CEFTRIAXONE SODIUM 2000 MG: 10 INJECTION, POWDER, FOR SOLUTION INTRAVENOUS at 17:31

## 2025-07-05 RX ADMIN — VANCOMYCIN HYDROCHLORIDE 1250 MG: 5 INJECTION, POWDER, LYOPHILIZED, FOR SOLUTION INTRAVENOUS at 10:47

## 2025-07-05 RX ADMIN — ASPIRIN 81 MG: 81 TABLET, COATED ORAL at 05:05

## 2025-07-05 RX ADMIN — ACETAMINOPHEN 650 MG: 325 TABLET ORAL at 05:08

## 2025-07-05 RX ADMIN — ENOXAPARIN SODIUM 40 MG: 100 INJECTION SUBCUTANEOUS at 17:31

## 2025-07-05 RX ADMIN — VANCOMYCIN HYDROCHLORIDE 1250 MG: 5 INJECTION, POWDER, LYOPHILIZED, FOR SOLUTION INTRAVENOUS at 21:50

## 2025-07-05 ASSESSMENT — ENCOUNTER SYMPTOMS
HEADACHES: 0
CHEST TIGHTNESS: 0
BLURRED VISION: 0
ABDOMINAL PAIN: 0
VOMITING: 0
COUGH: 0
COUGH: 1
WHEEZING: 0
STRIDOR: 0
PSYCHIATRIC NEGATIVE: 1
DIZZINESS: 0
CHILLS: 1
CHOKING: 0
MYALGIAS: 0
DIARRHEA: 1
APNEA: 0
NECK PAIN: 0
FEVER: 1
BLOOD IN STOOL: 0
NAUSEA: 0
CONSTIPATION: 0
BACK PAIN: 0
PALPITATIONS: 0
SHORTNESS OF BREATH: 0

## 2025-07-05 ASSESSMENT — FIBROSIS 4 INDEX: FIB4 SCORE: 2.15

## 2025-07-05 ASSESSMENT — PATIENT HEALTH QUESTIONNAIRE - PHQ9
SUM OF ALL RESPONSES TO PHQ9 QUESTIONS 1 AND 2: 0
1. LITTLE INTEREST OR PLEASURE IN DOING THINGS: NOT AT ALL
2. FEELING DOWN, DEPRESSED, IRRITABLE, OR HOPELESS: NOT AT ALL

## 2025-07-05 ASSESSMENT — PAIN DESCRIPTION - PAIN TYPE
TYPE: ACUTE PAIN
TYPE: ACUTE PAIN

## 2025-07-05 NOTE — CARE PLAN
The patient is Stable - Low risk of patient condition declining or worsening    Shift Goals  Clinical Goals: comfort, monitor  Patient Goals: updates  Family Goals: updates    Progress made toward(s) clinical / shift goals:      Problem: Knowledge Deficit - Standard  Goal: Patient and family/care givers will demonstrate understanding of plan of care, disease process/condition, diagnostic tests and medications  Outcome: Progressing     Problem: Safety  Goal: Will remain free from falls  Outcome: Progressing     Problem: Pain Management  Goal: Pain level will decrease to patient's comfort goal  Outcome: Progressing     Problem: Infection  Goal: Will remain free from infection  Outcome: Progressing     Problem: Respiratory:  Goal: Respiratory status will improve  Outcome: Progressing     Problem: Skin Integrity  Goal: Risk for impaired skin integrity will decrease  Outcome: Progressing     Problem: Mobility  Goal: Risk for activity intolerance will decrease  Outcome: Progressing

## 2025-07-05 NOTE — CARE PLAN
The patient is Stable - Low risk of patient condition declining or worsening    Shift Goals  Clinical Goals: updates, admission  Patient Goals: updates  Family Goals: updates    Progress made toward(s) clinical / shift goals:      Problem: Knowledge Deficit - Standard  Goal: Patient and family/care givers will demonstrate understanding of plan of care, disease process/condition, diagnostic tests and medications  7/4/2025 1757 by Dulce Mendoza R.N.  Outcome: Progressing  7/4/2025 1757 by CLAUDY Franco.N.  Outcome: Progressing     Problem: Infection  Goal: Will remain free from infection  Outcome: Progressing     Problem: Fluid Volume:  Goal: Will maintain balanced intake and output  Outcome: Progressing     Problem: Respiratory:  Goal: Respiratory status will improve  Outcome: Progressing     Problem: Skin Integrity  Goal: Risk for impaired skin integrity will decrease  Outcome: Progressing     Problem: Mobility  Goal: Risk for activity intolerance will decrease  Outcome: Progressing     Problem: Medication  Goal: Compliance with prescribed medication will improve  Outcome: Progressing

## 2025-07-05 NOTE — PROGRESS NOTES
Cardiology Follow Up Progress Note    Date of Service  7/5/2025    Attending Physician  Glory Prather*    Chief Complaint     Bacteremia 2/2 blood cultures from 7/3/2025 showing gram-positive cocci, plan for SHAHLA for evaluation of endocarditis    HPI  Rick Avitia is a 69 y.o. male with symptomatic severe AS status post TAVR 6/17/2024, pre-TAVR C 6/11/2024 showed no epicardial CAD, hypothyroid, LUIS, GERD admitted 7/4/2025 as a transfer from [facility] in [place] for fever and bacteremia concern for infective endocarditis.    Interim Events    No overnight cardiac events  Telemetry-SR  SBP appropriate  Appreciate ID consult  Plan for transesophageal echocardiogram for evaluation of endocarditis on Monday, 7/7/2025  N.p.o. Sunday night at midnight      Review of Systems  Review of Systems   Respiratory:  Negative for apnea, cough, choking, chest tightness, shortness of breath, wheezing and stridor.        Vital signs in last 24 hours  Temp:  [36.4 °C (97.5 °F)-36.8 °C (98.2 °F)] 36.5 °C (97.7 °F)  Pulse:  [] 69  Resp:  [16-18] 16  BP: (103-131)/(67-78) 126/75  SpO2:  [94 %-97 %] 95 %    Physical Exam  Physical Exam  Cardiovascular:      Rate and Rhythm: Normal rate and regular rhythm.      Pulses: Normal pulses.   Pulmonary:      Effort: Pulmonary effort is normal.   Skin:     General: Skin is warm.   Neurological:      Mental Status: He is alert. Mental status is at baseline.   Psychiatric:         Mood and Affect: Mood normal.         Lab Review  Lab Results   Component Value Date/Time    WBC 14.5 (H) 07/05/2025 01:26 AM    RBC 4.23 (L) 07/05/2025 01:26 AM    HEMOGLOBIN 12.0 (L) 07/05/2025 01:26 AM    HEMATOCRIT 37.5 (L) 07/05/2025 01:26 AM    MCV 88.7 07/05/2025 01:26 AM    MCH 28.4 07/05/2025 01:26 AM    MCHC 32.0 (L) 07/05/2025 01:26 AM    MPV 12.5 07/05/2025 01:26 AM      Lab Results   Component Value Date/Time    SODIUM 138 07/05/2025 01:26 AM    POTASSIUM 3.8 07/05/2025  "01:26 AM    CHLORIDE 105 07/05/2025 01:26 AM    CO2 22 07/05/2025 01:26 AM    GLUCOSE 111 (H) 07/05/2025 01:26 AM    BUN 10 07/05/2025 01:26 AM    CREATININE 0.97 07/05/2025 01:26 AM    BUNCREATRAT 16 07/26/2017 10:15 AM      Lab Results   Component Value Date/Time    ASTSGOT 25 07/04/2025 03:52 PM    ALTSGPT 18 07/04/2025 03:52 PM     Lab Results   Component Value Date/Time    CHOLSTRLTOT 142 08/29/2024 08:11 AM    LDL 57 08/29/2024 08:11 AM    HDL 65.0 (H) 08/29/2024 08:11 AM    TRIGLYCERIDE 101 08/29/2024 08:11 AM       No results for input(s): \"NTPROBNP\" in the last 72 hours.    Cardiac Imaging and Procedures Review  EKG 6/25/2025: Sinus bradycardia    Echocardiogram 6/17/2025:     1. Left ventricular systolic function is normal with an ejection fraction of  55 %.    2. Normal left atrial size.    3. TAVR functioning normal. Possible small PVL.          Imaging  Chest X-Ray 7/3/2025:       No acute cardiopulmonary disease    Assessment/Plan    # Gram-positive bacteremia  # Suspected infective endocarditis  # Leukocytosis  # Fever   #TAVR, 6/2024      -Appreciate ID consult.  -Patient is currently on IV vancomycin and ceftriaxone.  - Repeat blood cultures pending.  - Patient most likely need to undergo SHAHLA for evaluation of endocarditis.  - Tentatively plan for SHAHLA on Monday, 7/7/2025 depending on anesthesiologist availability.  - Will place patient on n.p.o. status Sunday at midnight.    Cardiology will follow along.    I personally spent a total of 15 minutes which includes face-to-face time and non-face-to-face time spent on preparing to see the patient, reviewing hospital notes and tests, obtaining history from the patient, performing a medically appropriate exam, counseling and educating the patient, ordering medications/tests/procedures/referrals as clinically indicated, and documenting information in the electronic medical record.       Thank you for allowing me to participate in the care of this " patient.  I will continue to follow this patient    Please contact me with any questions.    RIKA Hayden.   Cardiologist, [facility]  [phone]

## 2025-07-05 NOTE — CONSULTS
INFECTIOUS DISEASES INPATIENT CONSULT NOTE     Date of Service: 7/5/2025    Consult Requested By: Glory Vicente M.D.    Reason for Consultation: Gram-positive bacteremia, concern for infective endocarditis    History of Present Illness:   Venkat Avitia is a 69 y.o. man with history of severe aortic stenosis status post TAVR in June 2024 admitted 7/4/2025 from [facility] secondary to persistent fevers.  Extensive review of emergency physician notes, hospital medicine notes and consultant notes performed.  Patient states that he may complaining of fevers for approximately 4 weeks with a Tmax of 106 associated with fatigue and low energy.  He initially saw his primary care physician who ordered routine labs which showed some leukocytosis.  He was started on amoxicillin with some improvement of his fevers.  He completed his antibiotic course on Monday prior to admission however by Thursday, his fevers recurred with chills and increasing fatigue.  Denies any unintentional weight.  No recent dental procedures.  No new back pain or joint pain.  He initially presented to [place] on 7/3 where blood cultures were drawn and is now positive for gram-positive cocci, Streptococcus species.  Given his TAVR he was transferred to [facility] for higher level of care concern for possible endocarditis.  Repeat blood cultures on 7/4 are pending.  He is currently on vancomycin and ceftriaxone.  Plan for SHAHLA on 7/7.    All other review of systems reviewed and negative except those documented above in the HPI.     PMH:   Past Medical History[1]    PSH:  Past Surgical History[2]    FAMILY HX:  Family History   Problem Relation Age of Onset    Stroke Father     Prostate cancer Brother     Arthritis Other     Heart Disease Other     Hypertension Other     Stroke Other        SOCIAL HX:  Social History     Socioeconomic History    Marital status:      Spouse name: Nichole    Number of  children: 2    Years of education: Not on file    Highest education level: Not on file   Occupational History    Occupation: retired     Comment: [employer]   Tobacco Use    Smoking status: Never    Smokeless tobacco: Never   Vaping Use    Vaping status: Never Used   Substance and Sexual Activity    Alcohol use: Yes     Alcohol/week: 4.2 - 8.4 oz     Types: 7 - 14 Standard drinks or equivalent per week     Comment: socially    Drug use: Never    Sexual activity: Not on file   Other Topics Concern     Service No    Blood Transfusions Not Asked    Caffeine Concern No    Occupational Exposure Not Asked    Hobby Hazards Not Asked    Sleep Concern Not Asked    Stress Concern Not Asked    Weight Concern Not Asked    Special Diet Not Asked    Back Care Not Asked    Exercise No    Bike Helmet Not Asked    Seat Belt Yes    Self-Exams Not Asked   Social History Narrative    Not on file     Social Drivers of Health     Financial Resource Strain: Not on file   Food Insecurity: No Food Insecurity (6/18/2024)    Hunger Vital Sign     Worried About Running Out of Food in the Last Year: Never true     Ran Out of Food in the Last Year: Never true   Transportation Needs: No Transportation Needs (6/18/2024)    PRAPARE - Transportation     Lack of Transportation (Medical): No     Lack of Transportation (Non-Medical): No   Physical Activity: Inactive (8/30/2024)    Exercise Vital Sign     Days of Exercise per Week: 0 days     Minutes of Exercise per Session: 0 min   Stress: Not on file   Social Connections: Not on file   Intimate Partner Violence: Not At Risk (7/4/2025)    Humiliation, Afraid, Rape, and Kick questionnaire     Fear of Current or Ex-Partner: No     Emotionally Abused: No     Physically Abused: No     Sexually Abused: No   Housing Stability: Low Risk  (6/18/2024)    Housing Stability Vital Sign     Unable to Pay for Housing in the Last Year: No     Number of Places Lived in the Last Year: 1     Unstable  Housing in the Last Year: No     Tobacco Use History[3]  Social History     Substance and Sexual Activity   Alcohol Use Yes    Alcohol/week: 4.2 - 8.4 oz    Types: 7 - 14 Standard drinks or equivalent per week    Comment: socially       Allergies/Intolerances:  Allergies[4]    History reviewed with the patient     Other Current Medications:  Current Medications[5]  [unfilled]    Most Recent Vital Signs:  /67   Pulse 96   Temp 36.8 °C (98.2 °F) (Temporal)   Resp 16   Wt 94.3 kg (207 lb 14.3 oz)   SpO2 94%   BMI 28.20 kg/m²   Temp  Av.7 °C (98 °F)  Min: 36.5 °C (97.7 °F)  Max: 36.8 °C (98.2 °F)    Physical Exam:  General: well nourished, no diaphoresis, well-appearing, no acute distress  HEENT: sclera anicteric, extraocular muscles intact, mucous membranes moist, oropharynx clear and moist, no oral lesions or exudate  Neck: supple, no lymphadenopathy  Chest: CTAB, no rhonchi or wheezes, normal work of breathing.  Cardiac: regular rate and rhythm, normal S1 S2, no murmurs, rubs or gallops  Abdomen: + bowel sounds, soft, non-tender, non-distended  Extremities: WWP, no edema, 2+ pedal pulses  Skin: warm and dry, no rashes or worrisome lesions  Neuro: Alert and oriented times 3, non-focal exam, speech fluent, full range of motion to bilateral upper and lower extremities  Psych: normal mood and behavior, pleasant; memory intact, normal judgement    Pertinent Lab Results:  Recent Labs     25  0955 25  1552 25  0126   WBC 20.3* 16.2* 14.5*      Recent Labs     25  0955 25  1552 25  0126   HEMOGLOBIN 13.0* 12.6* 12.0*   HEMATOCRIT 38.8* 38.9* 37.5*   MCV 86.4 88.4 88.7   MCH 29.0 28.6 28.4   PLATELETCT 214 177 186         Recent Labs     25  1150 25  1552 25  0126   SODIUM 137 138 138   POTASSIUM 3.4* 3.7 3.8   CHLORIDE 103 105 105   CO2 24 22 22   CREATININE 1.0 1.09 0.97        Recent Labs     25  1150 25  1552   ALBUMIN 3.5 3.5         Pertinent Micro:  Results       ** No results found for the last 168 hours. **          Blood Culture   Date Value Ref Range Status   07/03/2025 (A)  Preliminary    Growth detected by automated blood culture system.  Gram Stain: Gram positive cocci  PRELIMINARY ORGANISM IDENTIFICATION BY DNA PCR TESTING:  Streptococcus species detected.          Studies:  CT-CHEST (THORAX) W/O  Result Date: 7/3/2025  HISTORY/REASON FOR EXAM: perssistent  cough, fever. TECHNIQUE/EXAM DESCRIPTION: CT scan of the chest without contrast.  Both automated exposure control and Automated adjustment of tube current based on patient size are utilized. 7/3/2025 1:36 PM. COMPARISON: None. TOTAL DLP: 501.91 mGy*cm FINDINGS: Exam limited by the lack of IV contrast. There is no focal consolidation, pneumothorax or effusion. The heart is normal in size without pericardial effusion. Aortic annuloplasty. No mediastinal, hilar or axillary lymphadenopathy. There are no concerning osseous lesions.  No fractures. The visualized upper abdomen is unremarkable.     No significant CT abnormalities of the chest. Inez [place] 7/3/2025 1:48 PM DLP Reporting Thresholds for Incorrect/Repeated Exams - DLP in mGy*cm Head/Neck:  0-year-old 3840, 1-year-old 5880, 5-year-old 8770, 10-year-old 86929 and adult 05940 Head:  0-year-old 4540, 1-year-old 7460, 5-year-old 81957, 10-year-old 06434 and adult 02407 Neck:  0-year-old 2940, 1-year-old 4160, 5-year-old 4550, 10-year-old 6320 and adult 8470 Chest:  0-year-old 550, 1-year-old 830, 5-year-old 1200, 10-year-old 3840 and adult 3570 Abd/pelvis:  0-year-old 440, 1-year-old 720, 5-year-old 1080, 10-year-old 3330 and adult 3330 Trunk(C/A/P):  0-year-old 490, 1-year-old 770, 5-year-old 1140, 10-year-old 3570 and adult 3330    DX-CHEST-2 VIEWS  Result Date: 7/3/2025  HISTORY/REASON FOR EXAM:  Fever. TECHNIQUE/EXAM DESCRIPTION AND NUMBER OF VIEWS: Chest x-ray (two views), 7/3/2025 12:35 PM. COMPARISON: 6/20/2025  FINDINGS: The lungs are clear.  The cardiomediastinal silhouette is midline and unremarkable.  The costophrenic angles are sharp.     No acute cardiopulmonary disease. Greg Friedman MD 7/3/2025 12:44 PM    EC-ECHOCARDIOGRAM COMPLETE W/O CONT  Result Date: 2025  Conclusions   1. Left ventricular systolic function is normal with an ejection fraction of 55 %.   2. Normal left atrial size.   3. TAVR functioning normal. Possible small PVL. Patient Info Name:     Venkat Avitia Age:     69 years :     1955 Gender:     Male MRN:     [id number] Accession #:     [id number] Ht:     72 in Wt:     223 lbs BSA:     2.28 m2 BP:     130 /     76 mmHg Exam Date:     2025 10:30 AM Patient Status:     O Admit Date:     2025 BMI:     30.49 kg/m^2 Technical Quality:     Fair Exam Type:     EC-ECHOCARDIOGRAM COMPLETE W/O CONT Study Info Complete two-dimensional, color flow and Doppler transthoracic echocardiogram is performed. Staff Ordering Physician:     Scotty De Dios Left Ventricle   Left ventricle is normal. Moderately concentric left ventricular wall thickness. Left ventricular systolic function is normal with an ejection fraction of 55 %. The left ventricular diastolic function is normal. Right Ventricle   Dilated right ventricle. Right ventricular systolic function is normal. Left Atrium   Normal left atrial size. Right Atrium   Normal right atrial size. Aortic Valve   TAVR functioning normal. Possible small PVL. No aortic valve stenosis. Trace aortic insufficiency. Pulmonic Valve   The pulmonic valve is normal. There is no pulmonic valve stenosis. There is mild pulmonic regurgitation. Mitral Valve   Mitral valve has normal leaflets. No mitral valve stenosis. Trace mitral valve regurgitation. Tricuspid Valve   Tricuspid valve leaflets are normal. No tricuspid stenosis. Trace tricuspid valve regurgitation. Unable to estimate pulmonary artery systolic pressure due to inadequate tricuspid regurgitant  envelope. Pericardium/Pleural   Pericardium appears normal. Inferior Vena Cava   Normal inferior vena cava with >50% collapse upon inspiration consistent with normal right atrial pressure, 3 mmHg. Aorta   The aortic measurements are indexed to age and body surface area. The aortic root at the sinus of Valsalva is normal measuring 3.5 cm with an index of 1.5 cm/m2. The prox ascending aorta is normal measuring 3.7 cm with an index of 1.6 cm/m2. Left Ventricular Outflow Tract ---------------------------------------------------------------------- Name                                 Value        Normal ---------------------------------------------------------------------- LVOT 2D ---------------------------------------------------------------------- LVOT Diameter                       2.0 cm               LVOT Doppler ---------------------------------------------------------------------- LVOT Peak Velocity                 89 cm/s               LVOT Peak Gradient                  3 mmHg               LVOT Mean Gradient                  2 mmHg               LVOT VTI                             17 cm               LVOT VTI/AV VTI Ratio                  0.6               LVOT Stroke Volume                   51 ml Pulmonic Valve ---------------------------------------------------------------------- Name                                 Value        Normal ---------------------------------------------------------------------- RVOT Doppler ---------------------------------------------------------------------- RVOT Peak Velocity                 58 cm/s               RVOT Peak Gradient                  1 mmHg               PV Doppler ---------------------------------------------------------------------- PV Peak Velocity                  109 cm/s               PV Peak Gradient                    5 mmHg               PV Regurgitation Doppler ----------------------------------------------------------------------  NH Peak End  Diastolic Velocity                           73 cm/s Mitral Valve ---------------------------------------------------------------------- Name                                 Value        Normal ---------------------------------------------------------------------- MV Annular TDI ---------------------------------------------------------------------- MV Septal e' Velocity             7.4 cm/s               MV Lateral e' Velocity           11.7 cm/s               MV e' Average                    9.53 cm/s Tricuspid Valve ---------------------------------------------------------------------- Name                                 Value        Normal ---------------------------------------------------------------------- Estimated PAP/RSVP ---------------------------------------------------------------------- RA Pressure                         3 mmHg           <=5 Aorta ---------------------------------------------------------------------- Name                                 Value        Normal ---------------------------------------------------------------------- Ascending Aorta ---------------------------------------------------------------------- Sinus of Valsalva Diameter          3.5 cm       3.1-3.7 Sinus of Valsalva Index          1.5 cm/m2         <=1.9 Prox Asc Ao Diameter                3.7 cm       2.6-3.4 Prox Asc Ao Diameter Index       1.6 cm/m2         <=1.7 Aortic Valve ---------------------------------------------------------------------- Name                                 Value        Normal ---------------------------------------------------------------------- AV Doppler ---------------------------------------------------------------------- AV Peak Velocity                  184 cm/s               AV Peak Gradient                   14 mmHg               AV Mean Gradient                    8 mmHg               AV VTI                               29 cm               AV Area (Cont Eq VTI)               1.8 cm2         >=3.0 AV Area (Cont Eq Matthew)              1.4 cm2               AV DI (Matthew)                           0.48               AV Regurgitation 2D ---------------------------------------------------------------------- LVOT Area                          3.0 cm2 Ventricles ---------------------------------------------------------------------- Name                                 Value        Normal ---------------------------------------------------------------------- LV Dimensions 2D/MM ----------------------------------------------------------------------  IVS Diastolic Thickness (2D)                                1.4 cm       0.6-1.0 LVID Diastole (2D)                  4.5 cm       4.2-5.8  LVIW Diastolic Thickness (2D)                                1.4 cm       0.6-1.0 LVID Systole (2D)                   2.7 cm       2.5-4.0 LVOT Diameter                       2.0 cm               LV Mass (2D Cubed)                240.72 g  88..00 LV Mass Index (2D Cubed)          105 g/m2          Relative Wall Thickness (2D)                                  0.63        <=0.42 LV Fractional Shortening/Ejection Fraction 2D/MM ----------------------------------------------------------------------  LV Fractional Shortening (2D)                                  41 %         25-43 LV EF (2D Teichholz)                  71 %                LV Diastolic Volume (4C MOD)                                138 ml               LV EF (4C MOD)                        39 %                LV Diastolic Volume (2C MOD)                                126 ml               LV EF (2C MOD)                        66 %                LV Diastolic Volume (BP MOD)                                136 ml          LV Diastolic Volume Index (BP MOD)                          60 ml/m2         34-74 LV Systolic Volume (BP MOD)          62 ml         21-61  LV Systolic Volume Index (BP MOD)                          27 ml/m2         11-31 LV  EF (BP MOD)                        55 %         52-72 LV Diastolic Length (4C)            8.8 cm               LV Systolic Length (4C)             7.9 cm               LV Stroke Volume (4C MOD)            54 ml               RV Dimensions 2D/MM ----------------------------------------------------------------------  RV Basal Diastolic Dimension                           4.5 cm       2.5-4.1  RV Mid-Cavity Diastolic Dimension                           3.2 cm       1.9-3.5 Atria ---------------------------------------------------------------------- Name                                 Value        Normal ---------------------------------------------------------------------- LA Dimensions ---------------------------------------------------------------------- LA Volume (4C A-L)                   43 ml               LA Volume (BP A-L)                   50 ml Report Signatures Finalized by Damian Bhatt on 06/23/2025 09:33 AM    DX-CHEST-2 VIEWS  Result Date: 6/20/2025  HISTORY/REASON FOR EXAM:  Cough. TECHNIQUE/EXAM DESCRIPTION AND NUMBER OF VIEWS: Chest x-ray (two views), 6/20/2025 11:01 AM. COMPARISON: None. FINDINGS: The lungs are clear.  The cardiomediastinal silhouette is midline and unremarkable.  The costophrenic angles are sharp. There are postoperative changes of the heart.     No acute cardiopulmonary disease. Greg Friedman MD 6/20/2025 11:33 AM      IMPRESSION:   Gram-positive bacteremia  Suspected infective endocarditis  Leukocytosis  History of severe aortic stenosis status post TAVR in June 2024      ASSESSMENT/PLAN:   Venkat Avitia is a 69 y.o. man with history of severe aortic stenosis status post TAVR in June 2024 admitted on 7/4/2025 from [facility] where he presented with persistent fevers.  Blood cultures they are now returned positive for gram-positive cocci, Streptococcus species.  Given presence of TAVR, and clinical presentation, concern for infective  endocarditis.    -Continue IV vancomycin and ceftriaxone 2 g daily  -Monitor renal function and Vanco trough  -Plan for SHAHLA on 7/7  -Follow repeat blood cultures from 7/4-pending  -Monitor for any new back pain or joint pain-currently denies    This infection pose a threat to life    Disposition: TBD    Need for PICC line: TBD      Plan of care discussed with GRACE Vicente M.D.. Will continue to follow    Penny Stack M.D.      Please note that this dictation was created using voice recognition software. I have worked with technical experts from [facility] to optimize the interface.  I have made every reasonable attempt to correct obvious errors, but there may be errors of grammar and possibly content that I did not discover before finalizing the note.         [1]   Past Medical History:  Diagnosis Date    BPH (benign prostatic hyperplasia)     Heart burn     Mild mitral regurgitation by prior echocardiogram 09/2013    nl ef and mild concentric LVH    Nonrheumatic aortic valve stenosis 10/14/2019    LUIS (obstructive sleep apnea)     [place] Pulm    Postoperative hypothyroidism 10/26/2015    Pre-diabetes     S/P TAVR (transcatheter aortic valve replacement)    [2]   Past Surgical History:  Procedure Laterality Date    TRANSCATHETER AORTIC VALVE REPLACEMENT Bilateral 6/17/2024    Procedure: TRANSCATHETER AORTIC VALVE REPLACEMENT;  Surgeon: Scotty De Dios M.D.;  Location: SURGERY [facility];  Service: Cardiac    ECHOCARDIOGRAM, TRANSESOPHAGEAL, INTRAOPERATIVE N/A 6/17/2024    Procedure: ECHOCARDIOGRAM, TRANSESOPHAGEAL, INTRAOPERATIVE;  Surgeon: Scotty De Dios M.D.;  Location: SURGERY [facility];  Service: Cardiac    CHOLECYSTECTOMY      Jeff    OTHER  1970    THYROID LOBECTOMY      TURP-VAPOR     [3]   Social History  Tobacco Use   Smoking Status Never   Smokeless Tobacco Never   [4] No Known Allergies  [5]   Current Facility-Administered Medications:     aspirin EC tablet 81 mg, 81 mg,  Oral, DAILY, Yang Qureshi D.O., 81 mg at 07/05/25 0505    levothyroxine (Synthroid) tablet 125 mcg, 125 mcg, Oral, DAILY, Yang Qureshi D.O., 125 mcg at 07/05/25 0505    enoxaparin (Lovenox) inj 40 mg, 40 mg, Subcutaneous, DAILY AT 1800, Yang Qureshi D.O., 40 mg at 07/04/25 1717    acetaminophen (Tylenol) tablet 650 mg, 650 mg, Oral, Q6HRS PRN, Yang Qureshi D.O., 650 mg at 07/05/25 0508    labetalol (Normodyne/Trandate) injection 10 mg, 10 mg, Intravenous, Q4HRS PRN, Yang Qureshi D.O.    ondansetron (Zofran) syringe/vial injection 4 mg, 4 mg, Intravenous, Q4HRS PRN **OR** ondansetron (Zofran ODT) dispertab 4 mg, 4 mg, Oral, Q4HRS PRN, Yang Qureshi D.O.    MD Alert...Vancomycin per Pharmacy, , Other, PHARMACY TO DOSE, Yang Qureshi D.O.    cefTRIAXone (Rocephin) syringe 2,000 mg, 2,000 mg, Intravenous, Q24HRS, Yang Qureshi D.O., 2,000 mg at 07/04/25 1717    vancomycin (Vancocin) 1,250 mg in  mL IVPB, 1,250 mg, Intravenous, Q12HR, Yang Qureshi D.O., Stopped at 07/04/25 4834

## 2025-07-05 NOTE — HOSPITAL COURSE
69-year-old male with severe AS s/p TAVR 6/2024 who presented initially at [facility] for fevers.  Patient reports fevers ongoing for the last 5 weeks.  He completed a course of Augmentin outpatient for suspected upper respiratory tract infection which initially worked however shortly after discontinuing this the fevers recurred.  Because of this, he was advised to go to the ED.  At the ED 7/3, blood cultures were taken and patient was given Unasyn and oral doxycycline and after discussion the patient stated he would like to go home.  Shortly after his blood cultures 2 out of 2 of 2 grew GPC's and so he was called to return to the ED.  In [place], they called the cardiologist and given his history of TAVR they recommended transfer to [facility] for SHAHLA and evaluation for endocarditis.    In [facility], cardiology and ID were consulted.  Started on vancomycin and ceftriaxone.  Plan for SHAHLA on 7/7.

## 2025-07-05 NOTE — CARE PLAN
The patient is Stable - Low risk of patient condition declining or worsening    Shift Goals  Clinical Goals: monitor labs, vss, safety,  Patient Goals: sleep  Family Goals: michaela    Progress made toward(s) clinical / shift goals:    Problem: Knowledge Deficit - Standard  Goal: Patient and family/care givers will demonstrate understanding of plan of care, disease process/condition, diagnostic tests and medications  Outcome: Progressing     Problem: Communication  Goal: The ability to communicate needs accurately and effectively will improve  Outcome: Progressing     Problem: Safety  Goal: Will remain free from injury  Outcome: Progressing  Goal: Will remain free from falls  Outcome: Progressing     Problem: Pain Management  Goal: Pain level will decrease to patient's comfort goal  Outcome: Progressing     Problem: Psychosocial Needs:  Goal: Level of anxiety will decrease  Outcome: Progressing     Problem: Respiratory:  Goal: Respiratory status will improve  Outcome: Progressing     Problem: Urinary Elimination:  Goal: Ability to reestablish a normal urinary elimination pattern will improve  Outcome: Progressing     Problem: Mobility  Goal: Risk for activity intolerance will decrease  Outcome: Progressing     Problem: Medication  Goal: Compliance with prescribed medication will improve  Outcome: Progressing       Patient is not progressing towards the following goals:

## 2025-07-05 NOTE — PROGRESS NOTES
Monitor Summary  Rhythm: SR/ST  Rate:   Ectopy: R PVC  Measurements: .14/.11/.37  ---12 hr Chart Review---

## 2025-07-05 NOTE — PROGRESS NOTES
Bedside shift report received from outgoing RN and pt care assumed. Pt is AAOx4, VSS, on room air satting 95, c/o 0/10 pain. Pt updated on plan of care, no questions or concerns at this time.  Tele Box on, rate and rhythm verified and being monitored.  Bed in lowest position, brakes on, call light and pt belongings within reach. Fall precautions maintained. Hourly rounding initiated. Care ongoing.

## 2025-07-05 NOTE — PROGRESS NOTES
Daily Progress Note    Date of Service  7/5/2025    Chief Complaint  Venkat Avitia is a 69 y.o. male admitted 7/4/2025 with fever and fatigue.    History of Present Illness  Venkat Avitia is a 69 y.o. male with a history of aortic stenosis s/p TAVR (2024) and partial thyroidectomy who presents with fever. He states he has had unrelenting fevers and fatigue ongoing for about 1 month. He presented to his primary care NP and had labs done which showed leukocytosis. He started a course of 5 days of amoxicillin which he finished on Monday 6/30 and states his fever improved with this treatment, but began worsening again with fatigue and chills by Thursday 7/3. He presented to [facility] on 7/3 and had blood cultures drawn which showed evidence of bacteremia, and he was transferred to [facility] for higher level of care.    Today he reports a mild cough but denies chills and states his fatigue has moderately improved since Thursday. He also endorses diarrhea, has been having 1-2 bowel movements per day. He denies nausea, abdominal pain, chest pain, shortness of breath, wheezing, myalgias, arthralgias, headache, dizziness, or changes in sensation.    Review of Systems  Review of Systems   Constitutional:  Positive for chills, fever and malaise/fatigue.   HENT:  Negative for congestion, ear pain and hearing loss.    Eyes:  Negative for blurred vision.   Respiratory:  Positive for cough. Negative for wheezing.    Cardiovascular:  Negative for chest pain, palpitations and leg swelling.   Gastrointestinal:  Positive for diarrhea. Negative for abdominal pain, blood in stool, constipation, melena, nausea and vomiting.   Musculoskeletal:  Negative for back pain, joint pain, myalgias and neck pain.   Skin:  Negative for itching and rash.   Neurological:  Negative for dizziness and headaches.   Psychiatric/Behavioral: Negative.          Past Medical History  PMHx: Aortic stenosis s/p TAVR June 2024  Hypothyroid s/p  thyroidectomy    Medications:  Current Outpatient Medications   Medication Instructions    aspirin 81 mg, Oral, DAILY    cyanocobalamin (VITAMIN B-12) 100 mcg, DAILY    levothyroxine (SYNTHROID) 125 mcg, Oral, DAILY    Magnesium Hydroxide (MAGNESIA PO) Take  by mouth.    multivitamin Tab 1 Tablet, DAILY    Potassium (POTASSIMIN PO) Take  by mouth.       Allergies: NKDA  Surgeries:   Past Surgical History:   Procedure Laterality Date    TRANSCATHETER AORTIC VALVE REPLACEMENT Bilateral 6/17/2024    Procedure: TRANSCATHETER AORTIC VALVE REPLACEMENT;  Surgeon: Scotty De Dios M.D.;  Location: SURGERY [facility];  Service: Cardiac    ECHOCARDIOGRAM, TRANSESOPHAGEAL, INTRAOPERATIVE N/A 6/17/2024    Procedure: ECHOCARDIOGRAM, TRANSESOPHAGEAL, INTRAOPERATIVE;  Surgeon: Scotty De Dios M.D.;  Location: SURGERY [facility];  Service: Cardiac    CHOLECYSTECTOMY      Jeff    OTHER  1970    THYROID LOBECTOMY      TURP-VAPOR        Immunizations: UTD    Family History  Family History   Problem Relation Age of Onset    Stroke Father     Prostate cancer Brother     Arthritis Other     Heart Disease Other     Hypertension Other     Stroke Other        Social History  Pt lives at home with his wife, has two adult children out of the home. 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 Drinks socially, 1-2 drinks per week. Denies smoking, drug use.    Physical Exam  Temp:  [36.4 °C (97.5 °F)-36.8 °C (98.2 °F)] 36.5 °C (97.7 °F)  Pulse:  [69-96] 69  Resp:  [16-17] 16  BP: (103-131)/(67-75) 126/75  SpO2:  [94 %-95 %] 95 %    Physical Exam  Constitutional:       General: He is not in acute distress.     Appearance: Normal appearance.   HENT:      Head: Normocephalic and atraumatic.      Right Ear: External ear normal.      Left Ear: External ear normal.      Nose: Nose normal.      Mouth/Throat:      Mouth: Mucous membranes are moist.      Pharynx: Oropharynx is clear.   Eyes:      Extraocular Movements: Extraocular  movements intact.      Conjunctiva/sclera: Conjunctivae normal.      Pupils: Pupils are equal, round, and reactive to light.   Neck:      Vascular: No carotid bruit.   Cardiovascular:      Rate and Rhythm: Normal rate and regular rhythm.      Pulses: Normal pulses.      Heart sounds: Murmur heard.   Pulmonary:      Effort: No respiratory distress.      Breath sounds: No wheezing, rhonchi or rales.   Chest:      Chest wall: No tenderness.   Abdominal:      General: There is no distension.      Palpations: Abdomen is soft. There is no mass.      Tenderness: There is no abdominal tenderness. There is no guarding or rebound.   Musculoskeletal:         General: No swelling, tenderness or deformity. Normal range of motion.      Cervical back: Normal range of motion and neck supple. No rigidity or tenderness.   Lymphadenopathy:      Cervical: No cervical adenopathy.   Skin:     General: Skin is warm and dry.      Capillary Refill: Capillary refill takes less than 2 seconds.      Coloration: Skin is not jaundiced.      Findings: No erythema.   Neurological:      General: No focal deficit present.      Mental Status: He is alert and oriented to person, place, and time.      Sensory: No sensory deficit.      Motor: No weakness.   Psychiatric:         Mood and Affect: Mood normal.         Fluids    Intake/Output Summary (Last 24 hours) at 7/5/2025 1518  Last data filed at 7/5/2025 0849  Gross per 24 hour   Intake 360 ml   Output --   Net 360 ml        Laboratory  Recent Labs     07/04/25  0955 07/04/25  1552 07/05/25  0126   WBC 20.3* 16.2* 14.5*   RBC 4.49* 4.40* 4.23*   HEMOGLOBIN 13.0* 12.6* 12.0*   HEMATOCRIT 38.8* 38.9* 37.5*   MCV 86.4 88.4 88.7   MCH 29.0 28.6 28.4   MCHC 33.5 32.4 32.0*   RDW 13.6 45.0 44.6   PLATELETCT 214 177 186   MPV 12.0* 11.3 12.5     Recent Labs     07/03/25  1150 07/04/25  1552 07/05/25  0126   SODIUM 137 138 138   POTASSIUM 3.4* 3.7 3.8   CHLORIDE 103 105 105   CO2 24 22 22   GLUCOSE 109* 110*  111*   BUN 13 13 10   CREATININE 1.0 1.09 0.97   CALCIUM 8.9 8.9 8.6                   Imaging  No orders to display         Assessment/Plan  #Gram-Positive Bacteremia  Pt currently on ceftriaxone 2000mg IV qday and vancomycin 1250mg/250mL IV. Pharmacy consulted on this treatment plan. Blood cultures were positive for Streptococcus species. PE negative for Janeway lesions, Osler nodes, or splinter hemorrhages. No new murmurs heard.  Most recent white count 14.5.  Pt is scheduled for SHAHLA on 7/7.  Continue abx therapy per recommendations.    #TAVR  Most recent echocardiogram showed normal functioning TAVR with small PVL. Systolic murmur appreciated on exam. Continue aspirin 81 mg.    #Thyroidectomy  Continue levothyroxine 125 mcg    VTE prophylaxis:    enoxaparin ppx      I have performed a physical exam and reviewed and updated ROS and Plan today (7/5/2025). In review of yesterday's note (7/4/2025), there are no changes except as documented above.

## 2025-07-05 NOTE — PROGRESS NOTES
[facility] Medicine Daily Progress Note    Date of Service  7/5/2025    Chief Complaint  Venkat Avitia is a 69 y.o. male admitted 7/4/2025 with fevers    Hospital Course  69-year-old male with severe AS s/p TAVR 6/2024 who presented initially at [facility] for fevers.  Patient reports fevers ongoing for the last 5 weeks.  He completed a course of Augmentin outpatient for suspected upper respiratory tract infection which initially worked however shortly after discontinuing this the fevers recurred.  Because of this, he was advised to go to the ED.  At the ED 7/3, blood cultures were taken and patient was given Unasyn and oral doxycycline and after discussion the patient stated he would like to go home.  Shortly after his blood cultures 2 out of 2 of 2 grew GPC's and so he was called to return to the ED.  In [place], they called the cardiologist and given his history of TAVR they recommended transfer to [facility] for SHAHLA and evaluation for endocarditis.    In [facility], cardiology and ID were consulted.  Started on vancomycin and ceftriaxone.  Plan for SHAHLA on 7/7.    Interval Problem Update    7/5  Patient new to me today  Blood cultures from [facility] 7/3 positive for Streptococcus species.  Pt feels well overall, no new complaints.     Continue vancomycin and ceftriaxone. Follow cultures  Plan for SHAHLA 7/7.  Discussed with ID     I have discussed this patient's plan of care and discharge plan at IDT rounds today with Case Management, Nursing, Nursing leadership, and other members of the IDT team.    Consultants/Specialty  ID, cardiology     Code Status  Full Code    Disposition    I have placed the appropriate orders for post-discharge needs.    Review of Systems  ROS     Physical Exam  Temp:  [36.4 °C (97.5 °F)-36.8 °C (98.2 °F)] 36.5 °C (97.7 °F)  Pulse:  [69-96] 69  Resp:  [16-17] 16  BP: (103-131)/(67-75) 126/75  SpO2:  [94 %-95 %] 95 %    Physical Exam  Constitutional:       Appearance: He is  ill-appearing.   Cardiovascular:      Rate and Rhythm: Normal rate and regular rhythm.      Pulses: Normal pulses.      Heart sounds: Normal heart sounds.   Pulmonary:      Effort: Pulmonary effort is normal.      Breath sounds: Normal breath sounds.   Abdominal:      General: Bowel sounds are normal. There is no distension.      Palpations: Abdomen is soft.      Tenderness: There is no abdominal tenderness. There is no guarding.   Musculoskeletal:      Right lower leg: No edema.      Left lower leg: No edema.   Skin:     General: Skin is warm.      Capillary Refill: Capillary refill takes less than 2 seconds.   Neurological:      Mental Status: He is oriented to person, place, and time. Mental status is at baseline.         Fluids    Intake/Output Summary (Last 24 hours) at 7/5/2025 1539  Last data filed at 7/5/2025 0849  Gross per 24 hour   Intake 360 ml   Output --   Net 360 ml        Laboratory  Recent Labs     07/04/25  0955 07/04/25  1552 07/05/25  0126   WBC 20.3* 16.2* 14.5*   RBC 4.49* 4.40* 4.23*   HEMOGLOBIN 13.0* 12.6* 12.0*   HEMATOCRIT 38.8* 38.9* 37.5*   MCV 86.4 88.4 88.7   MCH 29.0 28.6 28.4   MCHC 33.5 32.4 32.0*   RDW 13.6 45.0 44.6   PLATELETCT 214 177 186   MPV 12.0* 11.3 12.5     Recent Labs     07/03/25  1150 07/04/25  1552 07/05/25  0126   SODIUM 137 138 138   POTASSIUM 3.4* 3.7 3.8   CHLORIDE 103 105 105   CO2 24 22 22   GLUCOSE 109* 110* 111*   BUN 13 13 10   CREATININE 1.0 1.09 0.97   CALCIUM 8.9 8.9 8.6                   Imaging  No orders to display        Assessment/Plan  * Bacteremia due to Streptococcus- (present on admission)  Assessment & Plan  Patient blood cultures drawn 7/3 OSH due to persistent fevers, currently growing gram-positive bacteria, streptococcus  Patient will need infectious disease but awaiting further information on blood cultures  Continue on Ceftriaxone and vancomycin.   Concerning for endocarditis and patient that is status post TAVR 6/24  causing SIRS criteria  but no endorgan dysfunction to suggest sepsis  Plan for SHAHLA 7/7    S/P TAVR (transcatheter aortic valve replacement)- (present on admission)  Assessment & Plan  Has had appropriate outpatient follow-up and echocardiogram evaluation  Now with ongoing fevers, concern for endocarditis  Await cardiology recommendations    Postoperative hypothyroidism- (present on admission)  Assessment & Plan  Continue home Synthroid at 125 mcg daily         VTE prophylaxis:    enoxaparin ppx      I have performed a physical exam and reviewed and updated ROS and Plan today (7/5/2025). In review of yesterday's note (7/4/2025), there are no changes except as documented above.    Greater than 51 minutes spent prepping to see patient (e.g. review of tests) obtaining and/or reviewing separately obtained history. Performing a medically appropriate examination and evaluation.  Counseling and educating the patient/family/caregiver.  Ordering medications, tests, or procedures.  Referring and communicating with other health care professionals.  Documenting clinical information in EPIC.  Independently interpreting results and communicating results to patient/family/caregiver.  Care coordination.

## 2025-07-05 NOTE — PROGRESS NOTES
MD notified of lactic acid result of 2.1 and call from previous hospital regarding preliminary positive blood cultures.

## 2025-07-06 ENCOUNTER — APPOINTMENT (OUTPATIENT)
Dept: RADIOLOGY | Facility: MEDICAL CENTER | Age: 70
DRG: 872 | End: 2025-07-06
Payer: MEDICARE

## 2025-07-06 LAB
ANION GAP SERPL CALC-SCNC: 11 MMOL/L (ref 7–16)
BUN SERPL-MCNC: 12 MG/DL (ref 8–22)
CALCIUM SERPL-MCNC: 8.7 MG/DL (ref 8.5–10.5)
CHLORIDE SERPL-SCNC: 106 MMOL/L (ref 96–112)
CO2 SERPL-SCNC: 22 MMOL/L (ref 20–33)
CREAT SERPL-MCNC: 0.94 MG/DL (ref 0.5–1.4)
ERYTHROCYTE [DISTWIDTH] IN BLOOD BY AUTOMATED COUNT: 44.1 FL (ref 35.9–50)
GFR SERPLBLD CREATININE-BSD FMLA CKD-EPI: 88 ML/MIN/1.73 M 2
GLUCOSE SERPL-MCNC: 102 MG/DL (ref 65–99)
HCT VFR BLD AUTO: 37.8 % (ref 42–52)
HGB BLD-MCNC: 12.5 G/DL (ref 14–18)
MAGNESIUM SERPL-MCNC: 1.9 MG/DL (ref 1.5–2.5)
MCH RBC QN AUTO: 29.1 PG (ref 27–33)
MCHC RBC AUTO-ENTMCNC: 33.1 G/DL (ref 32.3–36.5)
MCV RBC AUTO: 87.9 FL (ref 81.4–97.8)
PHOSPHATE SERPL-MCNC: 3.6 MG/DL (ref 2.5–4.5)
PLATELET # BLD AUTO: 175 K/UL (ref 164–446)
PMV BLD AUTO: 12.3 FL (ref 9–12.9)
POTASSIUM SERPL-SCNC: 4.3 MMOL/L (ref 3.6–5.5)
RBC # BLD AUTO: 4.3 M/UL (ref 4.7–6.1)
SODIUM SERPL-SCNC: 139 MMOL/L (ref 135–145)
VANCOMYCIN PEAK SERPL-MCNC: 18.7 UG/ML (ref 20–40)
VANCOMYCIN TROUGH SERPL-MCNC: 9.8 UG/ML (ref 10–20)
WBC # BLD AUTO: 10 K/UL (ref 4.8–10.8)

## 2025-07-06 PROCEDURE — 80048 BASIC METABOLIC PNL TOTAL CA: CPT

## 2025-07-06 PROCEDURE — 36415 COLL VENOUS BLD VENIPUNCTURE: CPT

## 2025-07-06 PROCEDURE — 99232 SBSQ HOSP IP/OBS MODERATE 35: CPT | Mod: FS | Performed by: INTERNAL MEDICINE

## 2025-07-06 PROCEDURE — 80202 ASSAY OF VANCOMYCIN: CPT | Mod: 91

## 2025-07-06 PROCEDURE — 85027 COMPLETE CBC AUTOMATED: CPT

## 2025-07-06 PROCEDURE — 70355 PANORAMIC X-RAY OF JAWS: CPT

## 2025-07-06 PROCEDURE — 84100 ASSAY OF PHOSPHORUS: CPT

## 2025-07-06 PROCEDURE — 700105 HCHG RX REV CODE 258: Performed by: INTERNAL MEDICINE

## 2025-07-06 PROCEDURE — 83735 ASSAY OF MAGNESIUM: CPT

## 2025-07-06 PROCEDURE — 87040 BLOOD CULTURE FOR BACTERIA: CPT

## 2025-07-06 PROCEDURE — 770020 HCHG ROOM/CARE - TELE (206)

## 2025-07-06 PROCEDURE — 700102 HCHG RX REV CODE 250 W/ 637 OVERRIDE(OP): Performed by: INTERNAL MEDICINE

## 2025-07-06 PROCEDURE — 700105 HCHG RX REV CODE 258

## 2025-07-06 PROCEDURE — 700111 HCHG RX REV CODE 636 W/ 250 OVERRIDE (IP)

## 2025-07-06 PROCEDURE — A9270 NON-COVERED ITEM OR SERVICE: HCPCS | Performed by: INTERNAL MEDICINE

## 2025-07-06 PROCEDURE — 700111 HCHG RX REV CODE 636 W/ 250 OVERRIDE (IP): Performed by: INTERNAL MEDICINE

## 2025-07-06 PROCEDURE — 99233 SBSQ HOSP IP/OBS HIGH 50: CPT

## 2025-07-06 RX ADMIN — CEFTRIAXONE SODIUM 2000 MG: 10 INJECTION, POWDER, FOR SOLUTION INTRAVENOUS at 17:57

## 2025-07-06 RX ADMIN — ENOXAPARIN SODIUM 40 MG: 100 INJECTION SUBCUTANEOUS at 17:56

## 2025-07-06 RX ADMIN — ASPIRIN 81 MG: 81 TABLET, COATED ORAL at 06:18

## 2025-07-06 RX ADMIN — VANCOMYCIN HYDROCHLORIDE 1250 MG: 5 INJECTION, POWDER, LYOPHILIZED, FOR SOLUTION INTRAVENOUS at 18:00

## 2025-07-06 RX ADMIN — VANCOMYCIN HYDROCHLORIDE 1250 MG: 5 INJECTION, POWDER, LYOPHILIZED, FOR SOLUTION INTRAVENOUS at 09:30

## 2025-07-06 RX ADMIN — LEVOTHYROXINE SODIUM 125 MCG: 0.12 TABLET ORAL at 06:18

## 2025-07-06 ASSESSMENT — PAIN DESCRIPTION - PAIN TYPE: TYPE: ACUTE PAIN

## 2025-07-06 ASSESSMENT — ENCOUNTER SYMPTOMS
COUGH: 0
CHOKING: 0
SHORTNESS OF BREATH: 0
WHEEZING: 0
STRIDOR: 0
CHEST TIGHTNESS: 0
APNEA: 0

## 2025-07-06 ASSESSMENT — FIBROSIS 4 INDEX: FIB4 SCORE: 2.289679100985011031

## 2025-07-06 NOTE — PROGRESS NOTES
Cardiology Follow Up Progress Note    Date of Service  7/6/2025    Attending Physician  Glory Prather*    Chief Complaint     Bacteremia 2/2 blood cultures from 7/3/2025 showing gram-positive cocci, plan for SHAHLA for evaluation of endocarditis    HPI  Rick Avitia is a 69 y.o. male with symptomatic severe AS status post TAVR 6/17/2024, pre-TAVR C 6/11/2024 showed no epicardial CAD, hypothyroid, LUIS, GERD admitted 7/4/2025 as a transfer from [facility] in [place] for fever and bacteremia & concern for infective endocarditis.    Interim Events    No overnight cardiac events  Telemetry-SR  SBP appropriate  Appreciate ID consult  Plan for transesophageal echocardiogram for evaluation of endocarditis on Monday, 7/7/2025  N.p.o. Sunday night at midnight      Review of Systems  Review of Systems   Respiratory:  Negative for apnea, cough, choking, chest tightness, shortness of breath, wheezing and stridor.        Vital signs in last 24 hours  Temp:  [36.5 °C (97.7 °F)-36.7 °C (98 °F)] 36.6 °C (97.9 °F)  Pulse:  [69-91] 82  Resp:  [16-18] 18  BP: (104-126)/(65-78) 104/76  SpO2:  [92 %-95 %] 92 %    Physical Exam  Physical Exam  Cardiovascular:      Rate and Rhythm: Normal rate and regular rhythm.      Pulses: Normal pulses.   Pulmonary:      Effort: Pulmonary effort is normal.   Skin:     General: Skin is warm.   Neurological:      Mental Status: He is alert. Mental status is at baseline.   Psychiatric:         Mood and Affect: Mood normal.         Lab Review  Lab Results   Component Value Date/Time    WBC 10.0 07/06/2025 02:18 AM    RBC 4.30 (L) 07/06/2025 02:18 AM    HEMOGLOBIN 12.5 (L) 07/06/2025 02:18 AM    HEMATOCRIT 37.8 (L) 07/06/2025 02:18 AM    MCV 87.9 07/06/2025 02:18 AM    MCH 29.1 07/06/2025 02:18 AM    MCHC 33.1 07/06/2025 02:18 AM    MPV 12.3 07/06/2025 02:18 AM      Lab Results   Component Value Date/Time    SODIUM 139 07/06/2025 02:18 AM    POTASSIUM 4.3 07/06/2025 02:18 AM  "   CHLORIDE 106 07/06/2025 02:18 AM    CO2 22 07/06/2025 02:18 AM    GLUCOSE 102 (H) 07/06/2025 02:18 AM    BUN 12 07/06/2025 02:18 AM    CREATININE 0.94 07/06/2025 02:18 AM    BUNCREATRAT 16 07/26/2017 10:15 AM      Lab Results   Component Value Date/Time    ASTSGOT 25 07/04/2025 03:52 PM    ALTSGPT 18 07/04/2025 03:52 PM     Lab Results   Component Value Date/Time    CHOLSTRLTOT 142 08/29/2024 08:11 AM    LDL 57 08/29/2024 08:11 AM    HDL 65.0 (H) 08/29/2024 08:11 AM    TRIGLYCERIDE 101 08/29/2024 08:11 AM       No results for input(s): \"NTPROBNP\" in the last 72 hours.    Cardiac Imaging and Procedures Review  EKG 6/25/2025: Sinus bradycardia    Echocardiogram 6/17/2025:     1. Left ventricular systolic function is normal with an ejection fraction of  55 %.    2. Normal left atrial size.    3. TAVR functioning normal. Possible small PVL.          Imaging  Chest X-Ray 7/3/2025:       No acute cardiopulmonary disease    Assessment/Plan    # Gram-positive bacteremia  # Suspected infective endocarditis  # Leukocytosis  # Fever   #TAVR, 6/2024      -Appreciate ID consult.  -Per ID recommendation currently on IV vancomycin & ceftriaxone.  - Repeat blood cultures pending.  - Patient most likely need to undergo SHAHLA for evaluation of endocarditis.  - Tentatively plan for SHAHLA on Monday, 7/7/2025 depending on anesthesiologist availability.  - NPO Sunday at midnight.    Cardiology will follow along.    I personally spent a total of 15 minutes which includes face-to-face time and non-face-to-face time spent on preparing to see the patient, reviewing hospital notes and tests, obtaining history from the patient, performing a medically appropriate exam, counseling and educating the patient, ordering medications/tests/procedures/referrals as clinically indicated, and documenting information in the electronic medical record.       Thank you for allowing me to participate in the care of this patient.  I will continue to follow this " patient    Please contact me with any questions.    RIKA Hayden.   Cardiologist, [facility]  [phone]

## 2025-07-06 NOTE — PROGRESS NOTES
[facility] Medicine Daily Progress Note    Date of Service  7/6/2025    Chief Complaint  Venkat Avitai is a 69 y.o. male admitted 7/4/2025 with fevers    Hospital Course  69-year-old male with severe AS s/p TAVR 6/2024 who presented initially at [facility] for fevers.  Patient reports fevers ongoing for the last 5 weeks.  He completed a course of Augmentin outpatient for suspected upper respiratory tract infection which initially worked however shortly after discontinuing this the fevers recurred.  Because of this, he was advised to go to the ED.  At the ED 7/3, blood cultures were taken and patient was given Unasyn and oral doxycycline and after discussion the patient stated he would like to go home.  Shortly after his blood cultures 2 out of 2 of 2 grew GPC's and so he was called to return to the ED.  In [place], they called the cardiologist and given his history of TAVR they recommended transfer to [facility] for SHAHLA and evaluation for endocarditis.    In [facility], cardiology and ID were consulted.  Started on vancomycin and ceftriaxone.  Plan for SHAHLA on 7/7.    Interval Problem Update    7/6  MARCELL. HDS, on RA  SR with BBB on telemetry monitoring   Bcx ordered today  Continuing on Abx   SHAHLA planned for tomorrow.     I have discussed this patient's plan of care and discharge plan at IDT rounds today with Case Management, Nursing, Nursing leadership, and other members of the IDT team.    Consultants/Specialty  ID, cardiology     Code Status  Full Code    Disposition    I have placed the appropriate orders for post-discharge needs.    Review of Systems  ROS     Physical Exam  Temp:  [36.5 °C (97.7 °F)-36.7 °C (98 °F)] 36.6 °C (97.9 °F)  Pulse:  [69-91] 84  Resp:  [16-18] 17  BP: (104-126)/(65-78) 107/70  SpO2:  [92 %-96 %] 96 %    Physical Exam  Constitutional:       Appearance: He is ill-appearing.   Cardiovascular:      Rate and Rhythm: Normal rate and regular rhythm.      Pulses: Normal pulses.      Heart  sounds: Normal heart sounds.   Pulmonary:      Effort: Pulmonary effort is normal.      Breath sounds: Normal breath sounds.   Abdominal:      General: Bowel sounds are normal. There is no distension.      Palpations: Abdomen is soft.      Tenderness: There is no abdominal tenderness. There is no guarding.   Musculoskeletal:      Right lower leg: No edema.      Left lower leg: No edema.   Skin:     General: Skin is warm.      Capillary Refill: Capillary refill takes less than 2 seconds.   Neurological:      Mental Status: He is oriented to person, place, and time. Mental status is at baseline.         Fluids  No intake or output data in the 24 hours ending 07/06/25 1058       Laboratory  Recent Labs     07/04/25  1552 07/05/25  0126 07/06/25  0218   WBC 16.2* 14.5* 10.0   RBC 4.40* 4.23* 4.30*   HEMOGLOBIN 12.6* 12.0* 12.5*   HEMATOCRIT 38.9* 37.5* 37.8*   MCV 88.4 88.7 87.9   MCH 28.6 28.4 29.1   MCHC 32.4 32.0* 33.1   RDW 45.0 44.6 44.1   PLATELETCT 177 186 175   MPV 11.3 12.5 12.3     Recent Labs     07/04/25  1552 07/05/25  0126 07/06/25  0218   SODIUM 138 138 139   POTASSIUM 3.7 3.8 4.3   CHLORIDE 105 105 106   CO2 22 22 22   GLUCOSE 110* 111* 102*   BUN 13 10 12   CREATININE 1.09 0.97 0.94   CALCIUM 8.9 8.6 8.7                   Imaging  No orders to display        Assessment/Plan  * Bacteremia due to Streptococcus- (present on admission)  Assessment & Plan  Patient blood cultures drawn 7/3 OSH due to persistent fevers, currently growing gram-positive bacteria, streptococcus  Patient will need infectious disease but awaiting further information on blood cultures  Continue on Ceftriaxone and vancomycin.   High risk for vancomycin toxicity including renal failure. Monitoring levels and adjusting dose accordingly. Monitor renal function panel.   Concerning for endocarditis and patient that is status post TAVR 6/24  causing SIRS criteria but no endorgan dysfunction to suggest sepsis  Follow OSH Bcx and repeat Bcx  taken today 7/6  Plan for SHAHLA tomorrow 7/7    S/P TAVR (transcatheter aortic valve replacement)- (present on admission)  Assessment & Plan  Has had appropriate outpatient follow-up and echocardiogram evaluation  Now with ongoing fevers, concern for endocarditis  Await cardiology recommendations    Postoperative hypothyroidism- (present on admission)  Assessment & Plan  Continue home Synthroid at 125 mcg daily         VTE prophylaxis:    enoxaparin ppx      I have performed a physical exam and reviewed and updated ROS and Plan today (7/6/2025). In review of yesterday's note (7/5/2025), there are no changes except as documented above.

## 2025-07-06 NOTE — PROGRESS NOTES
Pharmacy Vancomycin Kinetics Note for 7/6/2025     69 y.o. male on Vancomycin day # 3     Vancomycin Indication (AUC Dosing): Strep sp. Bacteremia        Active Antibiotics (From admission, onward)      Ordered     Ordering Provider       Sun Jul 6, 2025  1:05 PM    07/06/25 1305  vancomycin (Vancocin) 1,250 mg in  mL IVPB  (vancomycin (VANCOCIN) IV (LD + Maintenance))  EVERY 8 HOURS         Glory Vicente M.D.       Fri Jul 4, 2025  3:54 PM    07/04/25 1554  cefTRIAXone (Rocephin) syringe 2,000 mg  EVERY 24 HOURS         Yang Qureshi D.O.    07/04/25 1554  MD Alert...Vancomycin per Pharmacy  PHARMACY TO DOSE        Question:  Indication(s) for vancomycin?  Answer:  Staphylococcus aureus bacteremia    Yang Qureshi D.O.            Dosing Weight: 93.7 kg (206 lb 9.1 oz)      Admission History: Admitted on 7/4/2025 for Gram-positive bacteremia [R78.81]  Endocarditis [I38]  Pertinent history: 68yo M presents to [facility] for (+) Bcx from [facility]. Prelim Bcx (+) GPCs. Empiric Vanco/Ctx initiated.    Allergies:     Patient has no known allergies.     Pertinent cultures to date:     Results       Procedure Component Value Units Date/Time    BLOOD CULTURE [[id number]] Collected: 07/06/25 0901    Order Status: Completed Specimen: Blood from Peripheral Updated: 07/06/25 1208     Significant Indicator NEG     Source BLD     Site PERIPHERAL     Culture Result No Growth  Note: Blood cultures are incubated for 5 days and  are monitored continuously.Positive blood cultures  are called to the RN and reported as soon as  they are identified.      BLOOD CULTURE [[id number]]     Order Status: Sent Specimen: Blood from Peripheral             Labs:     Estimated Creatinine Clearance: 82.6 mL/min (by C-G formula based on SCr of 0.94 mg/dL).  Recent Labs     07/04/25  0955 07/04/25  1552 07/05/25  0126 07/06/25  0218   WBC 20.3* 16.2* 14.5* 10.0   NEUTSPOLYS  --  85.10*  --   --      Recent Labs     07/04/25 1552  25  0126 25  0218   BUN 13 10 12   CREATININE 1.09 0.97 0.94   ALBUMIN 3.5  --   --      No intake or output data in the 24 hours ending 25 1308   /70   Pulse 84   Temp 36.6 °C (97.9 °F) (Temporal)   Resp 17   Wt 93 kg (205 lb 0.4 oz)   SpO2 96%  Temp (24hrs), Av.6 °C (97.9 °F), Min:36.5 °C (97.7 °F), Max:36.7 °C (98 °F)      List concerns for Vancomycin clearance:          Pharmacokinetics:     Two level kinetics:   Ke (hr ^-1): 0.0962  Half life: 7.2  Vd: Steady state Vd : 70.038  Calculated AUC: 370 mg·hr/L    Trough kinetics:   Recent Labs     25  0218 25  0901   VANCOTROUGH  --  9.8*   VANCOPEAK 18.7*  --        A/P:     -  Vancomycin dose: 1250 mg Q8h     -  Next vancomycin level(s):    -  @ 0500   - Vt @ 0930     -  Predicted vancomycin AUC from two level test calculator: 555 mg·hr/L      -  Comments: Pharmacy to follow     Walter LamasD

## 2025-07-06 NOTE — CARE PLAN
The patient is Stable - Low risk of patient condition declining or worsening    Shift Goals  Clinical Goals: vss, safety, abx, peak and trough for vanco  Patient Goals: sleep  Family Goals: michaela    Progress made toward(s) clinical / shift goals:    Problem: Knowledge Deficit - Standard  Goal: Patient and family/care givers will demonstrate understanding of plan of care, disease process/condition, diagnostic tests and medications  Outcome: Progressing     Problem: Communication  Goal: The ability to communicate needs accurately and effectively will improve  Outcome: Progressing     Problem: Safety  Goal: Will remain free from injury  Outcome: Progressing  Goal: Will remain free from falls  Outcome: Progressing     Problem: Pain Management  Goal: Pain level will decrease to patient's comfort goal  Outcome: Progressing     Problem: Psychosocial Needs:  Goal: Level of anxiety will decrease  Outcome: Progressing     Problem: Fluid Volume:  Goal: Will maintain balanced intake and output  Outcome: Progressing     Problem: Respiratory:  Goal: Respiratory status will improve  Outcome: Progressing     Problem: Skin Integrity  Goal: Risk for impaired skin integrity will decrease  Outcome: Progressing     Problem: Mobility  Goal: Risk for activity intolerance will decrease  Outcome: Progressing     Problem: Medication  Goal: Compliance with prescribed medication will improve  Outcome: Progressing       Patient is not progressing towards the following goals:

## 2025-07-06 NOTE — PROGRESS NOTES
Report received from NOC shift RN. Patient care assumed.On telebox confirmed by monitor, Ao x4, VSS.bed with two rails up locked in lowest position. Plan of care reviewed and white board updated. Call light within reach.

## 2025-07-06 NOTE — CARE PLAN
The patient is Stable - Low risk of patient condition declining or worsening    Shift Goals  Clinical Goals: Abx/Labs, Patient comfort and safety  Patient Goals: ambulate  Family Goals: PARTH    Progress made toward(s) clinical / shift goals:    Problem: Knowledge Deficit - Standard  Goal: Patient and family/care givers will demonstrate understanding of plan of care, disease process/condition, diagnostic tests and medications  Outcome: Progressing     Problem: Communication  Goal: The ability to communicate needs accurately and effectively will improve  Outcome: Progressing     Problem: Safety  Goal: Will remain free from injury  Outcome: Progressing     Problem: Pain Management  Goal: Pain level will decrease to patient's comfort goal  Outcome: Progressing       Patient is not progressing towards the following goals:

## 2025-07-06 NOTE — PROGRESS NOTES
Monitor Summary  Rhythm: SR BBB  Rate: 71-93  Ectopy: R PVC  Measurements: .15/.11/.37  ---12 hr Chart Review---

## 2025-07-07 ENCOUNTER — ANESTHESIA EVENT (OUTPATIENT)
Dept: CARDIOLOGY | Facility: MEDICAL CENTER | Age: 70
DRG: 872 | End: 2025-07-07
Payer: MEDICARE

## 2025-07-07 ENCOUNTER — ANESTHESIA (OUTPATIENT)
Dept: CARDIOLOGY | Facility: MEDICAL CENTER | Age: 70
DRG: 872 | End: 2025-07-07
Payer: MEDICARE

## 2025-07-07 ENCOUNTER — APPOINTMENT (OUTPATIENT)
Dept: CARDIOLOGY | Facility: MEDICAL CENTER | Age: 70
DRG: 872 | End: 2025-07-07
Attending: NURSE PRACTITIONER
Payer: MEDICARE

## 2025-07-07 LAB
ANION GAP SERPL CALC-SCNC: 10 MMOL/L (ref 7–16)
BUN SERPL-MCNC: 11 MG/DL (ref 8–22)
CALCIUM SERPL-MCNC: 9.1 MG/DL (ref 8.5–10.5)
CHLORIDE SERPL-SCNC: 105 MMOL/L (ref 96–112)
CO2 SERPL-SCNC: 23 MMOL/L (ref 20–33)
CREAT SERPL-MCNC: 0.91 MG/DL (ref 0.5–1.4)
ERYTHROCYTE [DISTWIDTH] IN BLOOD BY AUTOMATED COUNT: 41.8 FL (ref 35.9–50)
GFR SERPLBLD CREATININE-BSD FMLA CKD-EPI: 91 ML/MIN/1.73 M 2
GLUCOSE SERPL-MCNC: 118 MG/DL (ref 65–99)
HCT VFR BLD AUTO: 38.5 % (ref 42–52)
HGB BLD-MCNC: 12.6 G/DL (ref 14–18)
LV EJECT FRACT  99904: 65
MAGNESIUM SERPL-MCNC: 2.1 MG/DL (ref 1.5–2.5)
MCH RBC QN AUTO: 28.3 PG (ref 27–33)
MCHC RBC AUTO-ENTMCNC: 32.7 G/DL (ref 32.3–36.5)
MCV RBC AUTO: 86.3 FL (ref 81.4–97.8)
PHOSPHATE SERPL-MCNC: 4 MG/DL (ref 2.5–4.5)
PLATELET # BLD AUTO: 234 K/UL (ref 164–446)
PMV BLD AUTO: 12.3 FL (ref 9–12.9)
POTASSIUM SERPL-SCNC: 3.9 MMOL/L (ref 3.6–5.5)
RBC # BLD AUTO: 4.46 M/UL (ref 4.7–6.1)
SODIUM SERPL-SCNC: 138 MMOL/L (ref 135–145)
VANCOMYCIN PEAK SERPL-MCNC: 26.3 UG/ML (ref 20–40)
WBC # BLD AUTO: 8.1 K/UL (ref 4.8–10.8)

## 2025-07-07 PROCEDURE — 84100 ASSAY OF PHOSPHORUS: CPT

## 2025-07-07 PROCEDURE — 700102 HCHG RX REV CODE 250 W/ 637 OVERRIDE(OP): Performed by: INTERNAL MEDICINE

## 2025-07-07 PROCEDURE — 700111 HCHG RX REV CODE 636 W/ 250 OVERRIDE (IP): Performed by: INTERNAL MEDICINE

## 2025-07-07 PROCEDURE — 99233 SBSQ HOSP IP/OBS HIGH 50: CPT | Performed by: INTERNAL MEDICINE

## 2025-07-07 PROCEDURE — 93325 DOPPLER ECHO COLOR FLOW MAPG: CPT

## 2025-07-07 PROCEDURE — 700105 HCHG RX REV CODE 258: Performed by: INTERNAL MEDICINE

## 2025-07-07 PROCEDURE — 99232 SBSQ HOSP IP/OBS MODERATE 35: CPT

## 2025-07-07 PROCEDURE — 700111 HCHG RX REV CODE 636 W/ 250 OVERRIDE (IP): Mod: JZ | Performed by: STUDENT IN AN ORGANIZED HEALTH CARE EDUCATION/TRAINING PROGRAM

## 2025-07-07 PROCEDURE — 700105 HCHG RX REV CODE 258

## 2025-07-07 PROCEDURE — B24BZZ4 ULTRASONOGRAPHY OF HEART WITH AORTA, TRANSESOPHAGEAL: ICD-10-PCS | Performed by: INTERNAL MEDICINE

## 2025-07-07 PROCEDURE — 160002 HCHG RECOVERY MINUTES (STAT)

## 2025-07-07 PROCEDURE — 700111 HCHG RX REV CODE 636 W/ 250 OVERRIDE (IP)

## 2025-07-07 PROCEDURE — 770020 HCHG ROOM/CARE - TELE (206)

## 2025-07-07 PROCEDURE — 93325 DOPPLER ECHO COLOR FLOW MAPG: CPT | Mod: 26 | Performed by: INTERNAL MEDICINE

## 2025-07-07 PROCEDURE — 93321 DOPPLER ECHO F-UP/LMTD STD: CPT | Mod: 26 | Performed by: INTERNAL MEDICINE

## 2025-07-07 PROCEDURE — 80048 BASIC METABOLIC PNL TOTAL CA: CPT

## 2025-07-07 PROCEDURE — RXMED WILLOW AMBULATORY MEDICATION CHARGE

## 2025-07-07 PROCEDURE — 700105 HCHG RX REV CODE 258: Performed by: STUDENT IN AN ORGANIZED HEALTH CARE EDUCATION/TRAINING PROGRAM

## 2025-07-07 PROCEDURE — 83735 ASSAY OF MAGNESIUM: CPT

## 2025-07-07 PROCEDURE — 80202 ASSAY OF VANCOMYCIN: CPT

## 2025-07-07 PROCEDURE — 93312 ECHO TRANSESOPHAGEAL: CPT | Mod: 26 | Performed by: INTERNAL MEDICINE

## 2025-07-07 PROCEDURE — 85027 COMPLETE CBC AUTOMATED: CPT

## 2025-07-07 PROCEDURE — A9270 NON-COVERED ITEM OR SERVICE: HCPCS | Performed by: INTERNAL MEDICINE

## 2025-07-07 PROCEDURE — 160193 HCHG PACU STANDARD - 1ST 60 MINS

## 2025-07-07 RX ORDER — SODIUM CHLORIDE, SODIUM LACTATE, POTASSIUM CHLORIDE, CALCIUM CHLORIDE 600; 310; 30; 20 MG/100ML; MG/100ML; MG/100ML; MG/100ML
INJECTION, SOLUTION INTRAVENOUS
Status: DISCONTINUED | OUTPATIENT
Start: 2025-07-07 | End: 2025-07-07 | Stop reason: SURG

## 2025-07-07 RX ORDER — LABETALOL HYDROCHLORIDE 5 MG/ML
5 INJECTION, SOLUTION INTRAVENOUS
Status: DISCONTINUED | OUTPATIENT
Start: 2025-07-07 | End: 2025-07-07 | Stop reason: HOSPADM

## 2025-07-07 RX ORDER — DEXAMETHASONE SODIUM PHOSPHATE 4 MG/ML
INJECTION, SOLUTION INTRA-ARTICULAR; INTRALESIONAL; INTRAMUSCULAR; INTRAVENOUS; SOFT TISSUE PRN
Status: DISCONTINUED | OUTPATIENT
Start: 2025-07-07 | End: 2025-07-07 | Stop reason: SURG

## 2025-07-07 RX ORDER — HYDROMORPHONE HYDROCHLORIDE 1 MG/ML
0.4 INJECTION, SOLUTION INTRAMUSCULAR; INTRAVENOUS; SUBCUTANEOUS
Status: DISCONTINUED | OUTPATIENT
Start: 2025-07-07 | End: 2025-07-07 | Stop reason: HOSPADM

## 2025-07-07 RX ORDER — ONDANSETRON 2 MG/ML
4 INJECTION INTRAMUSCULAR; INTRAVENOUS
Status: DISCONTINUED | OUTPATIENT
Start: 2025-07-07 | End: 2025-07-07 | Stop reason: HOSPADM

## 2025-07-07 RX ORDER — OXYCODONE HCL 5 MG/5 ML
10 SOLUTION, ORAL ORAL
Status: DISCONTINUED | OUTPATIENT
Start: 2025-07-07 | End: 2025-07-07 | Stop reason: HOSPADM

## 2025-07-07 RX ORDER — HALOPERIDOL 5 MG/ML
1 INJECTION INTRAMUSCULAR
Status: DISCONTINUED | OUTPATIENT
Start: 2025-07-07 | End: 2025-07-07 | Stop reason: HOSPADM

## 2025-07-07 RX ORDER — EPHEDRINE SULFATE 50 MG/ML
5 INJECTION, SOLUTION INTRAVENOUS
Status: DISCONTINUED | OUTPATIENT
Start: 2025-07-07 | End: 2025-07-07 | Stop reason: HOSPADM

## 2025-07-07 RX ORDER — MIDAZOLAM HYDROCHLORIDE 1 MG/ML
INJECTION INTRAMUSCULAR; INTRAVENOUS PRN
Status: DISCONTINUED | OUTPATIENT
Start: 2025-07-07 | End: 2025-07-07 | Stop reason: SURG

## 2025-07-07 RX ORDER — ONDANSETRON 2 MG/ML
INJECTION INTRAMUSCULAR; INTRAVENOUS PRN
Status: DISCONTINUED | OUTPATIENT
Start: 2025-07-07 | End: 2025-07-07 | Stop reason: SURG

## 2025-07-07 RX ORDER — ALBUTEROL SULFATE 5 MG/ML
2.5 SOLUTION RESPIRATORY (INHALATION)
Status: DISCONTINUED | OUTPATIENT
Start: 2025-07-07 | End: 2025-07-07 | Stop reason: HOSPADM

## 2025-07-07 RX ORDER — HYDRALAZINE HYDROCHLORIDE 20 MG/ML
5 INJECTION INTRAMUSCULAR; INTRAVENOUS
Status: DISCONTINUED | OUTPATIENT
Start: 2025-07-07 | End: 2025-07-07 | Stop reason: HOSPADM

## 2025-07-07 RX ORDER — HYDROMORPHONE HYDROCHLORIDE 1 MG/ML
0.1 INJECTION, SOLUTION INTRAMUSCULAR; INTRAVENOUS; SUBCUTANEOUS
Status: DISCONTINUED | OUTPATIENT
Start: 2025-07-07 | End: 2025-07-07 | Stop reason: HOSPADM

## 2025-07-07 RX ORDER — OXYCODONE HCL 5 MG/5 ML
5 SOLUTION, ORAL ORAL
Status: DISCONTINUED | OUTPATIENT
Start: 2025-07-07 | End: 2025-07-07 | Stop reason: HOSPADM

## 2025-07-07 RX ORDER — LINEZOLID 600 MG/1
600 TABLET, FILM COATED ORAL 2 TIMES DAILY
Qty: 26 TABLET | Refills: 0 | Status: ACTIVE | OUTPATIENT
Start: 2025-07-07 | End: 2025-07-21

## 2025-07-07 RX ORDER — DIPHENHYDRAMINE HYDROCHLORIDE 50 MG/ML
12.5 INJECTION, SOLUTION INTRAMUSCULAR; INTRAVENOUS
Status: DISCONTINUED | OUTPATIENT
Start: 2025-07-07 | End: 2025-07-07 | Stop reason: HOSPADM

## 2025-07-07 RX ORDER — HYDROMORPHONE HYDROCHLORIDE 1 MG/ML
0.2 INJECTION, SOLUTION INTRAMUSCULAR; INTRAVENOUS; SUBCUTANEOUS
Status: DISCONTINUED | OUTPATIENT
Start: 2025-07-07 | End: 2025-07-07 | Stop reason: HOSPADM

## 2025-07-07 RX ORDER — METOPROLOL TARTRATE 1 MG/ML
1 INJECTION, SOLUTION INTRAVENOUS
Status: DISCONTINUED | OUTPATIENT
Start: 2025-07-07 | End: 2025-07-07 | Stop reason: HOSPADM

## 2025-07-07 RX ADMIN — ASPIRIN 81 MG: 81 TABLET, COATED ORAL at 04:30

## 2025-07-07 RX ADMIN — LEVOTHYROXINE SODIUM 125 MCG: 0.12 TABLET ORAL at 04:30

## 2025-07-07 RX ADMIN — CEFTRIAXONE SODIUM 2000 MG: 10 INJECTION, POWDER, FOR SOLUTION INTRAVENOUS at 17:46

## 2025-07-07 RX ADMIN — PROPOFOL 150 MCG/KG/MIN: 10 INJECTION, EMULSION INTRAVENOUS at 08:07

## 2025-07-07 RX ADMIN — DEXAMETHASONE SODIUM PHOSPHATE 8 MG: 4 INJECTION INTRA-ARTICULAR; INTRALESIONAL; INTRAMUSCULAR; INTRAVENOUS; SOFT TISSUE at 08:13

## 2025-07-07 RX ADMIN — SODIUM CHLORIDE, POTASSIUM CHLORIDE, SODIUM LACTATE AND CALCIUM CHLORIDE: 600; 310; 30; 20 INJECTION, SOLUTION INTRAVENOUS at 08:01

## 2025-07-07 RX ADMIN — VANCOMYCIN HYDROCHLORIDE 1250 MG: 5 INJECTION, POWDER, LYOPHILIZED, FOR SOLUTION INTRAVENOUS at 01:50

## 2025-07-07 RX ADMIN — FENTANYL CITRATE 50 MCG: 50 INJECTION, SOLUTION INTRAMUSCULAR; INTRAVENOUS at 08:07

## 2025-07-07 RX ADMIN — PROPOFOL 40 MG: 10 INJECTION, EMULSION INTRAVENOUS at 08:08

## 2025-07-07 RX ADMIN — ONDANSETRON 4 MG: 2 INJECTION INTRAMUSCULAR; INTRAVENOUS at 08:13

## 2025-07-07 RX ADMIN — MIDAZOLAM HYDROCHLORIDE 2 MG: 1 INJECTION, SOLUTION INTRAMUSCULAR; INTRAVENOUS at 08:07

## 2025-07-07 ASSESSMENT — LIFESTYLE VARIABLES
TOTAL SCORE: 0
EVER HAD A DRINK FIRST THING IN THE MORNING TO STEADY YOUR NERVES TO GET RID OF A HANGOVER: NO
HAVE YOU EVER FELT YOU SHOULD CUT DOWN ON YOUR DRINKING: NO
ALCOHOL_USE: YES
TOTAL SCORE: 0
AVERAGE NUMBER OF DAYS PER WEEK YOU HAVE A DRINK CONTAINING ALCOHOL: 2
TOTAL SCORE: 0
DOES PATIENT WANT TO STOP DRINKING: NO
HOW MANY TIMES IN THE PAST YEAR HAVE YOU HAD 5 OR MORE DRINKS IN A DAY: 0
CONSUMPTION TOTAL: NEGATIVE
HAVE PEOPLE ANNOYED YOU BY CRITICIZING YOUR DRINKING: NO
EVER FELT BAD OR GUILTY ABOUT YOUR DRINKING: NO
ON A TYPICAL DAY WHEN YOU DRINK ALCOHOL HOW MANY DRINKS DO YOU HAVE: 1

## 2025-07-07 ASSESSMENT — ENCOUNTER SYMPTOMS
CHILLS: 0
FEVER: 0

## 2025-07-07 ASSESSMENT — FIBROSIS 4 INDEX: FIB4 SCORE: 1.71

## 2025-07-07 ASSESSMENT — PAIN DESCRIPTION - PAIN TYPE: TYPE: ACUTE PAIN

## 2025-07-07 ASSESSMENT — PAIN SCALES - GENERAL: PAIN_LEVEL: 0

## 2025-07-07 NOTE — PROGRESS NOTES
[facility] Medicine Daily Progress Note    Date of Service  7/7/2025    Chief Complaint  Venkat Avitia is a 69 y.o. male admitted 7/4/2025 with fevers    Hospital Course  69-year-old male with severe AS s/p TAVR 6/2024 who presented initially at [facility] for fevers.  Patient reports fevers ongoing for the last 5 weeks.  He completed a course of Augmentin outpatient for suspected upper respiratory tract infection which initially worked however shortly after discontinuing this the fevers recurred.  Because of this, he was advised to go to the ED.  At the ED 7/3, blood cultures were taken and patient was given Unasyn and oral doxycycline and after discussion the patient stated he would like to go home.  Shortly after his blood cultures 2 out of 2 of 2 grew GPC's and so he was called to return to the ED.  In [place], they called the cardiologist and given his history of TAVR they recommended transfer to [facility] for SHAHLA and evaluation for endocarditis.    In [facility], cardiology and ID were consulted.  Started on vancomycin and ceftriaxone.  Plan for SHAHLA on 7/7.    Interval Problem Update    7/7  NAEO.  HDS, on RA  BCx from 7/3/2025 preliminary grew Streptococcus oralis.  BCx 7/4 and 7/6 NGTD.  Patient denies any active jaw or tooth pain.  Does state he has more than a dozen crowns placed years ago.  On ceftriaxone and vancomycin.  CBC and metabolic panel reviewed, overall unremarkable.  Panorex with no acute abnormality.  SHAHLA done today with no acute abnormality     Discontinue vancomycin. Continue on Ceftriaxone.   Discussed with ID - given SHAHLA findings, plan for 2 week course of Abx stop date 7/20/2025. On discharge plan to transition to oral linezolid 600mg BID. CBC with differential 1 week post discharge to monitor for cytopenias. Recommend to monitor overnight to make sure Bcx neg x 48hrs. If ok, anticipate discharge tomorrow.  Linezolid and CBC order placed on discharge.   D/w pt, daughter, and wife  POC.     I have discussed this patient's plan of care and discharge plan at IDT rounds today with Case Management, Nursing, Nursing leadership, and other members of the IDT team.    Consultants/Specialty  ID, cardiology     Code Status  Full Code    Disposition    I have placed the appropriate orders for post-discharge needs.    Review of Systems  ROS     Physical Exam  Temp:  [35.9 °C (96.7 °F)-36.8 °C (98.2 °F)] 36.6 °C (97.8 °F)  Pulse:  [62-87] 69  Resp:  [12-17] 16  BP: ()/(51-84) 133/78  SpO2:  [91 %-98 %] 92 %    Physical Exam  Constitutional:       Appearance: He is ill-appearing.   Cardiovascular:      Rate and Rhythm: Normal rate and regular rhythm.      Pulses: Normal pulses.      Heart sounds: Normal heart sounds.   Pulmonary:      Effort: Pulmonary effort is normal.      Breath sounds: Normal breath sounds.   Abdominal:      General: Bowel sounds are normal. There is no distension.      Palpations: Abdomen is soft.      Tenderness: There is no abdominal tenderness. There is no guarding.   Musculoskeletal:      Right lower leg: No edema.      Left lower leg: No edema.   Skin:     General: Skin is warm.      Capillary Refill: Capillary refill takes less than 2 seconds.   Neurological:      Mental Status: He is oriented to person, place, and time. Mental status is at baseline.         Fluids    Intake/Output Summary (Last 24 hours) at 7/7/2025 1356  Last data filed at 7/7/2025 1339  Gross per 24 hour   Intake 1570 ml   Output --   Net 1570 ml          Laboratory  Recent Labs     07/05/25  0126 07/06/25  0218 07/07/25  0509   WBC 14.5* 10.0 8.1   RBC 4.23* 4.30* 4.46*   HEMOGLOBIN 12.0* 12.5* 12.6*   HEMATOCRIT 37.5* 37.8* 38.5*   MCV 88.7 87.9 86.3   MCH 28.4 29.1 28.3   MCHC 32.0* 33.1 32.7   RDW 44.6 44.1 41.8   PLATELETCT 186 175 234   MPV 12.5 12.3 12.3     Recent Labs     07/05/25  0126 07/06/25  0218 07/07/25  0509   SODIUM 138 139 138   POTASSIUM 3.8 4.3 3.9   CHLORIDE 105 106 105   CO2 22 22  23   GLUCOSE 111* 102* 118*   BUN 10 12 11   CREATININE 0.97 0.94 0.91   CALCIUM 8.6 8.7 9.1                   Imaging  EC-SHAHLA W/O CONT   Final Result      [id number]   Final Result      No acute osseous abnormality.           Assessment/Plan  * Bacteremia due to Streptococcus- (present on admission)  Assessment & Plan  Patient blood cultures drawn 7/3 OSH due to persistent fevers, currently growing gram-positive bacteria, streptococcus  Patient will need infectious disease but awaiting further information on blood cultures  Continue on Ceftriaxone and vancomycin.   High risk for vancomycin toxicity including renal failure. Monitoring levels and adjusting dose accordingly. Monitor renal function panel.   Concerning for endocarditis and patient that is status post TAVR 6/24  causing SIRS criteria but no endorgan dysfunction to suggest sepsis    7/7  Panorex with no acute abnormality.  SHAHLA done today with no acute abnormality   Discontinue vancomycin. Continue on Ceftriaxone.   Discussed with ID - given SHAHLA findings, plan for 2 week course of Abx stop date 7/20/2025. On discharge plan to transition to oral linezolid 600mg BID. CBC with differential 1 week post discharge to monitor for cytopenias. Recommend to monitor overnight to make sure Bcx neg x 48hrs. If ok, anticipate discharge tomorrow.  Linezolid and CBC order placed on discharge.     S/P TAVR (transcatheter aortic valve replacement)- (present on admission)  Assessment & Plan  Has had appropriate outpatient follow-up and echocardiogram evaluation  Now with ongoing fevers, concern for endocarditis  Await cardiology recommendations    Postoperative hypothyroidism- (present on admission)  Assessment & Plan  Continue home Synthroid at 125 mcg daily         VTE prophylaxis:    enoxaparin ppx      I have performed a physical exam and reviewed and updated ROS and Plan today (7/7/2025). In review of yesterday's note (7/6/2025), there are no changes except  as documented above.

## 2025-07-07 NOTE — PROGRESS NOTES
Monitor summary: SR 74-87, WY 0.14, QRS 0.10, QT 0.38, with rare PVCs per strip from monitor room.

## 2025-07-07 NOTE — ANESTHESIA TIME REPORT
Anesthesia Start and Stop Event Times       Date Time Event    7/7/2025 0801 Anesthesia Start     0827 Anesthesia Stop          Responsible Staff  07/07/25      Name Role Begin End    Christiano Ingram M.D. Anesth 0801 0827          Overtime Reason:  no overtime (within assigned shift)    Comments:

## 2025-07-07 NOTE — ANESTHESIA PREPROCEDURE EVALUATION
Date/Time: 07/07/25 0800    Scheduled providers: Sam Escamilla M.D.; Christiano Ingram M.D.    Procedure: EC-SHAHLA W/O CONT    Diagnosis:             Gram-positive bacteremia [R78.81]      Endocarditis [I38]            Gram-positive bacteremia [R78.81]      Endocarditis [I38]    Indications: Bacteremia    Location: [facility]ocard[facility]            Relevant Problems   ANESTHESIA   (positive) LUIS (obstructive sleep apnea)      GI   (positive) Gastroesophageal reflux disease without esophagitis      ENDO   (positive) Postoperative hypothyroidism       Physical Exam    Airway   Mallampati: II  TM distance: >3 FB  Neck ROM: full       Cardiovascular - normal exam  Rhythm: regular  Rate: normal    (-) murmur     Dental - normal exam           Pulmonary - normal examBreath sounds clear to auscultation     Abdominal    Neurological - normal exam                   Anesthesia Plan    ASA 3   ASA physical status 3 criteria: moderate reduction of ejection fraction    Plan - MAC               Induction: intravenous    Postoperative Plan: Postoperative administration of opioids is intended.    Pertinent diagnostic labs and testing reviewed    Informed Consent:    Anesthetic plan and risks discussed with patient.    Use of blood products discussed with: patient whom consented to blood products.

## 2025-07-07 NOTE — PROGRESS NOTES
Rick Avitia is a 69 y.o. male with symptomatic severe AS status post TAVR 6/17/2024, pre-TAVR [facility] 6/11/2024 showed no epicardial CAD, hypothyroid, LUIS, GERD admitted 7/4/2025 as a transfer from [facility] in [place] for fever and bacteremia & concern for infective endocarditis.     SHAHLA today showed no endocarditis.       Thank you for allowing me to participate in the care of this patient.   Cardiology will sign off.     Pricilla DAMON, Cardiology  [facility], [address]  [address] [address]  Phone: [phone]  Fax: [phone]

## 2025-07-07 NOTE — PROGRESS NOTES
NO HARD CHART:   Patient to procedure Room for SHAHLA with no hard chart, only loose papers. No hard chart available at this time. Procedure RN completed Pre-Procedure Consent paperwork packet, including: WHO Yellow Sheet signed by RN and MD, Informed consent for SHAHLA procedure signed by RN, patient, and Cardiology, and Informed consent for Anesthesia signed by Anesthesia MD and patient. Procedure Packet secured to loose papers and hand delivered to receiving PACU RN who acknowledged receipt and no hard chart.

## 2025-07-07 NOTE — CARE PLAN
The patient is Stable - Low risk of patient condition declining or worsening    Shift Goals  Clinical Goals: ABX, labs, safety  Patient Goals: rest  Family Goals: PARTH    Progress made toward(s) clinical / shift goals:    Problem: Knowledge Deficit - Standard  Goal: Patient and family/care givers will demonstrate understanding of plan of care, disease process/condition, diagnostic tests and medications  Outcome: Progressing     Problem: Safety  Goal: Will remain free from injury  Outcome: Progressing     Problem: Pain Management  Goal: Pain level will decrease to patient's comfort goal  Outcome: Progressing       Patient is not progressing towards the following goals:

## 2025-07-07 NOTE — OR NURSING
Pt's complaining of 4/10 left sided chest soreness. MD Escamilla notified and aware. No orders at this time. Will continue to monitor.

## 2025-07-07 NOTE — CARE PLAN
The patient is Stable - Low risk of patient condition declining or worsening    Shift Goals  Clinical Goals: continue abx, SHAHLA, f/u cx, ID recs  Patient Goals: SHAHLA, discharge  Family Goals: PARTH    Progress made toward(s) clinical / shift goals:  SHAHLA complete with no signs of endocarditis, ID consult complete with final recs documented, post dc meds ordered in case patient needs prior auth for abx    Patient is not progressing towards the following goals: patient still requires abx through mid July      Problem: Knowledge Deficit - Standard  Goal: Patient and family/care givers will demonstrate understanding of plan of care, disease process/condition, diagnostic tests and medications  Outcome: Progressing     Problem: Communication  Goal: The ability to communicate needs accurately and effectively will improve  Outcome: Progressing     Problem: Safety  Goal: Will remain free from injury  Outcome: Progressing  Goal: Will remain free from falls  Outcome: Progressing     Problem: Respiratory:  Goal: Respiratory status will improve  Outcome: Progressing     Problem: Skin Integrity  Goal: Risk for impaired skin integrity will decrease  Outcome: Progressing

## 2025-07-07 NOTE — PROGRESS NOTES
Rec'd report from previous nurse regarding prior 12 hours.  POC reviewed with pt.  White board updated.  Pt verbalizes understanding.  Call light within reach.  Pt tolerated morning meds. Patient NPO for SHAHLA, moved up on schedule d/t cancellation. Patient notified, agrees with POC.

## 2025-07-07 NOTE — PROGRESS NOTES
Infectious Disease Progress Note    Author: Penny Stack M.D. Date & Time of service: 2025  7:35 AM    Chief Complaint:  Follow-up for Streptococcus mitis bacteremia, concern for infective endocarditis    Interval History:   patient afebrile, WBC 8.1.  Blood cultures positive for Streptococcus mitis.  SHAHLA negative for infective endocarditis.  Patient admits to aggressive flossing with bleeding of his gums, which could be the source of his bacteremia.  Plan of care regarding antibiotic course for 2 weeks discussed with patient and family at bedside.  Questions answered.      Labs Reviewed and Medications Reviewed.    Review of Systems:  Review of Systems   Constitutional:  Negative for chills and fever.       Hemodynamics:  Temp (24hrs), Av.7 °C (98 °F), Min:36.6 °C (97.9 °F), Max:36.8 °C (98.2 °F)  Temperature: 36.6 °C (97.9 °F), Monitored Temp: 36.5 °C (97.7 °F)  Pulse  Av.5  Min: 69  Max: 102   Blood Pressure : 125/77       Physical Exam:  Physical Exam  Vitals and nursing note reviewed.   Cardiovascular:      Rate and Rhythm: Normal rate.   Pulmonary:      Effort: Pulmonary effort is normal.   Neurological:      Mental Status: He is alert and oriented to person, place, and time.   Psychiatric:         Mood and Affect: Mood normal.         Behavior: Behavior normal.         Meds:    Current Facility-Administered Medications:     vancomycin    aspirin    levothyroxine    enoxaparin (LOVENOX) injection    acetaminophen    labetalol    ondansetron **OR** ondansetron    MD Alert...Vancomycin per Pharmacy    cefTRIAXone (ROCEPHIN) IV    Labs:  Recent Labs     25  1552 25  0126 25  0218 25  0509   WBC 16.2* 14.5* 10.0 8.1   RBC 4.40* 4.23* 4.30* 4.46*   HEMOGLOBIN 12.6* 12.0* 12.5* 12.6*   HEMATOCRIT 38.9* 37.5* 37.8* 38.5*   MCV 88.4 88.7 87.9 86.3   MCH 28.6 28.4 29.1 28.3   RDW 45.0 44.6 44.1 41.8   PLATELETCT 177 186 175 234   MPV 11.3 12.5 12.3 12.3   NEUTSPOLYS 85.10*   --   --   --    LYMPHOCYTES 8.30*  --   --   --    MONOCYTES 5.30  --   --   --    EOSINOPHILS 0.40  --   --   --    BASOPHILS 0.20  --   --   --      Recent Labs     07/05/25  0126 07/06/25  0218 07/07/25  0509   SODIUM 138 139 138   POTASSIUM 3.8 4.3 3.9   CHLORIDE 105 106 105   CO2 22 22 23   GLUCOSE 111* 102* 118*   BUN 10 12 11     Recent Labs     07/04/25  1552 07/05/25  0126 07/06/25  0218 07/07/25  0509   ALBUMIN 3.5  --   --   --    TBILIRUBIN 0.6  --   --   --    ALKPHOSPHAT 74  --   --   --    TOTPROTEIN 6.8  --   --   --    ALTSGPT 18  --   --   --    ASTSGOT 25  --   --   --    CREATININE 1.09 0.97 0.94 0.91       Imaging:  [id number]  Result Date: 7/6/2025 7/6/2025 5:45 PM HISTORY/REASON FOR EXAM:  Dental disease. TECHNIQUE/EXAM DESCRIPTION AND NUMBER OF VIEWS:  1 view(s) of the mandible. COMPARISON:  None. FINDINGS: No acute fracture or dislocation. Multiple dental implants. No periapical lucency.     No acute osseous abnormality.    CT-CHEST (THORAX) W/O  Result Date: 7/3/2025  HISTORY/REASON FOR EXAM: perssistent  cough, fever. TECHNIQUE/EXAM DESCRIPTION: CT scan of the chest without contrast.  Both automated exposure control and Automated adjustment of tube current based on patient size are utilized. 7/3/2025 1:36 PM. COMPARISON: None. TOTAL DLP: 501.91 mGy*cm FINDINGS: Exam limited by the lack of IV contrast. There is no focal consolidation, pneumothorax or effusion. The heart is normal in size without pericardial effusion. Aortic annuloplasty. No mediastinal, hilar or axillary lymphadenopathy. There are no concerning osseous lesions.  No fractures. The visualized upper abdomen is unremarkable.     No significant CT abnormalities of the chest. Inez [facility] 7/3/2025 1:48 PM DLP Reporting Thresholds for Incorrect/Repeated Exams - DLP in mGy*cm Head/Neck:  0-year-old 3840, 1-year-old 5880, 5-year-old 8770, 10-year-old 96161 and adult 81797 Head:  0-year-old 4540, 1-year-old 7460,  5-year-old 51462, 10-year-old 24834 and adult 99645 Neck:  0-year-old 2940, 1-year-old 4160, 5-year-old 4550, 10-year-old 6320 and adult 8470 Chest:  0-year-old 550, 1-year-old 830, 5-year-old 1200, 10-year-old 3840 and adult 3570 Abd/pelvis:  0-year-old 440, 1-year-old 720, 5-year-old 1080, 10-year-old 3330 and adult 3330 Trunk(C/A/P):  0-year-old 490, 1-year-old 770, 5-year-old 1140, 10-year-old 3570 and adult 3330    DX-CHEST-2 VIEWS  Result Date: 7/3/2025  HISTORY/REASON FOR EXAM:  Fever. TECHNIQUE/EXAM DESCRIPTION AND NUMBER OF VIEWS: Chest x-ray (two views), 7/3/2025 12:35 PM. COMPARISON: 2025 FINDINGS: The lungs are clear.  The cardiomediastinal silhouette is midline and unremarkable.  The costophrenic angles are sharp.     No acute cardiopulmonary disease. Greg Friedman MD 7/3/2025 12:44 PM    EC-ECHOCARDIOGRAM COMPLETE W/O CONT  Result Date: 2025  Conclusions   1. Left ventricular systolic function is normal with an ejection fraction of 55 %.   2. Normal left atrial size.   3. TAVR functioning normal. Possible small PVL. Patient Info Name:     Venkat Avitia Age:     69 years :     1955 Gender:     Male MRN:     [id number] Accession #:     [id number] Ht:     72 in Wt:     223 lbs BSA:     2.28 m2 BP:     130 /     76 mmHg Exam Date:     2025 10:30 AM Patient Status:     O Admit Date:     2025 BMI:     30.49 kg/m^2 Technical Quality:     Fair Exam Type:     EC-ECHOCARDIOGRAM COMPLETE W/O CONT Study Info Complete two-dimensional, color flow and Doppler transthoracic echocardiogram is performed. Staff Ordering Physician:     Scotty De Dios Left Ventricle   Left ventricle is normal. Moderately concentric left ventricular wall thickness. Left ventricular systolic function is normal with an ejection fraction of 55 %. The left ventricular diastolic function is normal. Right Ventricle   Dilated right ventricle. Right ventricular systolic function is normal. Left Atrium   Normal  left atrial size. Right Atrium   Normal right atrial size. Aortic Valve   TAVR functioning normal. Possible small PVL. No aortic valve stenosis. Trace aortic insufficiency. Pulmonic Valve   The pulmonic valve is normal. There is no pulmonic valve stenosis. There is mild pulmonic regurgitation. Mitral Valve   Mitral valve has normal leaflets. No mitral valve stenosis. Trace mitral valve regurgitation. Tricuspid Valve   Tricuspid valve leaflets are normal. No tricuspid stenosis. Trace tricuspid valve regurgitation. Unable to estimate pulmonary artery systolic pressure due to inadequate tricuspid regurgitant envelope. Pericardium/Pleural   Pericardium appears normal. Inferior Vena Cava   Normal inferior vena cava with >50% collapse upon inspiration consistent with normal right atrial pressure, 3 mmHg. Aorta   The aortic measurements are indexed to age and body surface area. The aortic root at the sinus of Valsalva is normal measuring 3.5 cm with an index of 1.5 cm/m2. The prox ascending aorta is normal measuring 3.7 cm with an index of 1.6 cm/m2. Left Ventricular Outflow Tract ---------------------------------------------------------------------- Name                                 Value        Normal ---------------------------------------------------------------------- LVOT 2D ---------------------------------------------------------------------- LVOT Diameter                       2.0 cm               LVOT Doppler ---------------------------------------------------------------------- LVOT Peak Velocity                 89 cm/s               LVOT Peak Gradient                  3 mmHg               LVOT Mean Gradient                  2 mmHg               LVOT VTI                             17 cm               LVOT VTI/AV VTI Ratio                  0.6               LVOT Stroke Volume                   51 ml Pulmonic Valve ---------------------------------------------------------------------- Name                                  Value        Normal ---------------------------------------------------------------------- RVOT Doppler ---------------------------------------------------------------------- RVOT Peak Velocity                 58 cm/s               RVOT Peak Gradient                  1 mmHg               PV Doppler ---------------------------------------------------------------------- PV Peak Velocity                  109 cm/s               PV Peak Gradient                    5 mmHg               PV Regurgitation Doppler ----------------------------------------------------------------------  MN Peak End Diastolic Velocity                           73 cm/s Mitral Valve ---------------------------------------------------------------------- Name                                 Value        Normal ---------------------------------------------------------------------- MV Annular TDI ---------------------------------------------------------------------- MV Septal e' Velocity             7.4 cm/s               MV Lateral e' Velocity           11.7 cm/s               MV e' Average                    9.53 cm/s Tricuspid Valve ---------------------------------------------------------------------- Name                                 Value        Normal ---------------------------------------------------------------------- Estimated PAP/RSVP ---------------------------------------------------------------------- RA Pressure                         3 mmHg           <=5 Aorta ---------------------------------------------------------------------- Name                                 Value        Normal ---------------------------------------------------------------------- Ascending Aorta ---------------------------------------------------------------------- Sinus of Valsalva Diameter          3.5 cm       3.1-3.7 Sinus of Valsalva Index          1.5 cm/m2         <=1.9 Prox Asc Ao Diameter                3.7 cm       2.6-3.4 Prox Asc Ao  Diameter Index       1.6 cm/m2         <=1.7 Aortic Valve ---------------------------------------------------------------------- Name                                 Value        Normal ---------------------------------------------------------------------- AV Doppler ---------------------------------------------------------------------- AV Peak Velocity                  184 cm/s               AV Peak Gradient                   14 mmHg               AV Mean Gradient                    8 mmHg               AV VTI                               29 cm               AV Area (Cont Eq VTI)              1.8 cm2         >=3.0 AV Area (Cont Eq Matthew)              1.4 cm2               AV DI (Matthew)                           0.48               AV Regurgitation 2D ---------------------------------------------------------------------- LVOT Area                          3.0 cm2 Ventricles ---------------------------------------------------------------------- Name                                 Value        Normal ---------------------------------------------------------------------- LV Dimensions 2D/MM ----------------------------------------------------------------------  IVS Diastolic Thickness (2D)                                1.4 cm       0.6-1.0 LVID Diastole (2D)                  4.5 cm       4.2-5.8  LVIW Diastolic Thickness (2D)                                1.4 cm       0.6-1.0 LVID Systole (2D)                   2.7 cm       2.5-4.0 LVOT Diameter                       2.0 cm               LV Mass (2D Cubed)                240.72 g  88..00 LV Mass Index (2D Cubed)          105 g/m2          Relative Wall Thickness (2D)                                  0.63        <=0.42 LV Fractional Shortening/Ejection Fraction 2D/MM ----------------------------------------------------------------------  LV Fractional Shortening (2D)                                  41 %         25-43 LV EF (2D Fiona)                  71  %                LV Diastolic Volume (4C MOD)                                138 ml               LV EF (4C MOD)                        39 %                LV Diastolic Volume (2C MOD)                                126 ml               LV EF (2C MOD)                        66 %                LV Diastolic Volume (BP MOD)                                136 ml          LV Diastolic Volume Index (BP MOD)                          60 ml/m2         34-74 LV Systolic Volume (BP MOD)          62 ml         21-61  LV Systolic Volume Index (BP MOD)                          27 ml/m2         11-31 LV EF (BP MOD)                        55 %         52-72 LV Diastolic Length (4C)            8.8 cm               LV Systolic Length (4C)             7.9 cm               LV Stroke Volume (4C MOD)            54 ml               RV Dimensions 2D/MM ----------------------------------------------------------------------  RV Basal Diastolic Dimension                           4.5 cm       2.5-4.1  RV Mid-Cavity Diastolic Dimension                           3.2 cm       1.9-3.5 Atria ---------------------------------------------------------------------- Name                                 Value        Normal ---------------------------------------------------------------------- LA Dimensions ---------------------------------------------------------------------- LA Volume (4C A-L)                   43 ml               LA Volume (BP A-L)                   50 ml Report Signatures Finalized by Damian Bhatt on 06/23/2025 09:33 AM    DX-CHEST-2 VIEWS  Result Date: 6/20/2025  HISTORY/REASON FOR EXAM:  Cough. TECHNIQUE/EXAM DESCRIPTION AND NUMBER OF VIEWS: Chest x-ray (two views), 6/20/2025 11:01 AM. COMPARISON: None. FINDINGS: The lungs are clear.  The cardiomediastinal silhouette is midline and unremarkable.  The costophrenic angles are sharp. There are postoperative changes of the heart.     No acute cardiopulmonary disease.  Greg Friedman MD 6/20/2025 11:33 AM      Micro:  Results       Procedure Component Value Units Date/Time    BLOOD CULTURE [[id number]] Collected: 07/06/25 1306    Order Status: Completed Specimen: Blood from Peripheral Updated: 07/06/25 1608     Significant Indicator NEG     Source BLD     Site PERIPHERAL     Culture Result No Growth  Note: Blood cultures are incubated for 5 days and  are monitored continuously.Positive blood cultures  are called to the RN and reported as soon as  they are identified.      BLOOD CULTURE [[id number]] Collected: 07/06/25 0901    Order Status: Completed Specimen: Blood from Peripheral Updated: 07/06/25 1208     Significant Indicator NEG     Source BLD     Site PERIPHERAL     Culture Result No Growth  Note: Blood cultures are incubated for 5 days and  are monitored continuously.Positive blood cultures  are called to the RN and reported as soon as  they are identified.              Assessment:  Active Hospital Problems    Diagnosis     *Bacteremia due to Streptococcus [R78.81, B95.5]     S/P TAVR (transcatheter aortic valve replacement) [Z95.2]     Postoperative hypothyroidism [E89.0]       69 y.o. man with history of severe aortic stenosis status post TAVR in June 2024 admitted on 7/4/2025 from [facility] where he presented with persistent fevers.  Blood cultures are positive for Streptococcus mitis.  Given presence of TAVR, and clinical presentation, concern for infective endocarditis.     Pertinent diagnoses:  Streptococcus mitis bacteremia  Leukocytosis, resolved  History of severe aortic stenosis status post TAVR in June 2024    Plan:  -Continue IV ceftriaxone 2 g daily  -Blood cultures on 7/3+ Streptococcus mitis, pan susceptible  -Repeat blood cultures on 7/6-negative to date  - SHAHLA today shows no evidence of infective endocarditis  -Since SHAHLA negative, will plan a 2-week course of antibiotics from 7/16 with stop date of 7/20/2025.  At discharge, he can  transition to oral linezolid 600 mg twice daily to complete antibiotic course above.  Please ensure that patient can obtain linezolid prior to discharge as this may require prior authorization.  -Recommend continued amoxicillin 2 g antibiotic prophylaxis prior to any dental cleaning and/or dental procedures.  Patient is currently doing this.  He will also discuss with his dentist about other options for flossing.  -Please check a CBC with differential 1 week postdischarge to monitor for any cytopenias while on linezolid-hospitalist to order    This infection pose a threat to life    Disposition: Home on oral antibiotics.  Patient will follow-up with his PCP in [place]    Need for PICC line: No    Discussed with internal medicine/Dr. Thomas.  ID signing off but will follow repeat blood cultures peripherally to ensure they remain negative

## 2025-07-07 NOTE — ANESTHESIA POSTPROCEDURE EVALUATION
Patient: Venkat Avitia    Procedure Summary       Date: 07/07/25 Room / Location: [facility] - Echocardiology [facility]    Anesthesia Start: 0801 Anesthesia Stop: 0827    Procedure: EC-SHAHLA W/O CONT Diagnosis:             Gram-positive bacteremia      Endocarditis            Gram-positive bacteremia      Endocarditis      (Bacteremia)    Scheduled Providers: Sam Escamilla M.D.; Christiano Ingram M.D. Responsible Provider: Christiano Ingram M.D.    Anesthesia Type: MAC ASA Status: 3            Final Anesthesia Type: MAC  Last vitals  BP   Blood Pressure : 111/64    Temp   35.9 °C (96.7 °F)    Pulse   68   Resp   12    SpO2   92 %      Anesthesia Post Evaluation    Patient location during evaluation: PACU  Patient participation: complete - patient participated  Level of consciousness: awake and alert  Pain score: 0    Airway patency: patent  Anesthetic complications: no  Cardiovascular status: hemodynamically stable  Respiratory status: acceptable  Hydration status: euvolemic    PONV: none          No notable events documented.     Nurse Pain Score: 0 (NPRS)

## 2025-07-08 ENCOUNTER — PHARMACY VISIT (OUTPATIENT)
Dept: PHARMACY | Facility: MEDICAL CENTER | Age: 70
End: 2025-07-08
Payer: COMMERCIAL

## 2025-07-08 VITALS
TEMPERATURE: 97.3 F | RESPIRATION RATE: 18 BRPM | OXYGEN SATURATION: 96 % | BODY MASS INDEX: 27.44 KG/M2 | WEIGHT: 202.6 LBS | HEIGHT: 72 IN | HEART RATE: 64 BPM | DIASTOLIC BLOOD PRESSURE: 75 MMHG | SYSTOLIC BLOOD PRESSURE: 120 MMHG

## 2025-07-08 LAB
ANION GAP SERPL CALC-SCNC: 12 MMOL/L (ref 7–16)
BUN SERPL-MCNC: 18 MG/DL (ref 8–22)
CALCIUM SERPL-MCNC: 9.1 MG/DL (ref 8.5–10.5)
CHLORIDE SERPL-SCNC: 102 MMOL/L (ref 96–112)
CO2 SERPL-SCNC: 22 MMOL/L (ref 20–33)
CREAT SERPL-MCNC: 0.99 MG/DL (ref 0.5–1.4)
ERYTHROCYTE [DISTWIDTH] IN BLOOD BY AUTOMATED COUNT: 41.3 FL (ref 35.9–50)
GFR SERPLBLD CREATININE-BSD FMLA CKD-EPI: 83 ML/MIN/1.73 M 2
GLUCOSE SERPL-MCNC: 167 MG/DL (ref 65–99)
HCT VFR BLD AUTO: 40.4 % (ref 42–52)
HGB BLD-MCNC: 13.4 G/DL (ref 14–18)
MAGNESIUM SERPL-MCNC: 2 MG/DL (ref 1.5–2.5)
MCH RBC QN AUTO: 28.7 PG (ref 27–33)
MCHC RBC AUTO-ENTMCNC: 33.2 G/DL (ref 32.3–36.5)
MCV RBC AUTO: 86.5 FL (ref 81.4–97.8)
PHOSPHATE SERPL-MCNC: 3.9 MG/DL (ref 2.5–4.5)
PLATELET # BLD AUTO: 208 K/UL (ref 164–446)
PMV BLD AUTO: 12.1 FL (ref 9–12.9)
POTASSIUM SERPL-SCNC: 4.9 MMOL/L (ref 3.6–5.5)
RBC # BLD AUTO: 4.67 M/UL (ref 4.7–6.1)
SODIUM SERPL-SCNC: 136 MMOL/L (ref 135–145)
WBC # BLD AUTO: 12.3 K/UL (ref 4.8–10.8)

## 2025-07-08 PROCEDURE — 80048 BASIC METABOLIC PNL TOTAL CA: CPT

## 2025-07-08 PROCEDURE — A9270 NON-COVERED ITEM OR SERVICE: HCPCS | Performed by: INTERNAL MEDICINE

## 2025-07-08 PROCEDURE — 700102 HCHG RX REV CODE 250 W/ 637 OVERRIDE(OP): Performed by: INTERNAL MEDICINE

## 2025-07-08 PROCEDURE — 99239 HOSP IP/OBS DSCHRG MGMT >30: CPT | Performed by: INTERNAL MEDICINE

## 2025-07-08 PROCEDURE — 84100 ASSAY OF PHOSPHORUS: CPT

## 2025-07-08 PROCEDURE — 85027 COMPLETE CBC AUTOMATED: CPT

## 2025-07-08 PROCEDURE — 83735 ASSAY OF MAGNESIUM: CPT

## 2025-07-08 RX ADMIN — ASPIRIN 81 MG: 81 TABLET, COATED ORAL at 04:45

## 2025-07-08 RX ADMIN — LEVOTHYROXINE SODIUM 125 MCG: 0.12 TABLET ORAL at 04:45

## 2025-07-08 ASSESSMENT — COGNITIVE AND FUNCTIONAL STATUS - GENERAL
MOBILITY SCORE: 24
SUGGESTED CMS G CODE MODIFIER MOBILITY: CH
DAILY ACTIVITIY SCORE: 24
SUGGESTED CMS G CODE MODIFIER DAILY ACTIVITY: CH

## 2025-07-08 ASSESSMENT — PAIN DESCRIPTION - PAIN TYPE: TYPE: ACUTE PAIN

## 2025-07-08 ASSESSMENT — FIBROSIS 4 INDEX: FIB4 SCORE: 1.93

## 2025-07-08 NOTE — PROGRESS NOTES
Discharge orders received.  Patient arrived to the discharge lounge.  PIV removed by floor RN. Meds to beds medications verified by discharge RN, bag with medication given to patient.  Instructions given, medications reviewed and general discharge education provided to patient.  Follow up appointments discussed.  Patient verbalized understanding of dc instructions and prescriptions.  Patient signed discharge instructions.  Patient verbalized he had all belongings with him, Denied having any home medications locked in our inpatient pharmacy that  he needs back. Patient ambulated with steady gait from discharge lounge to private vehicle. Patient left via car with family member to home in stable condition.

## 2025-07-08 NOTE — CARE PLAN
The patient is Stable - Low risk of patient condition declining or worsening    Shift Goals  Clinical Goals: safety, VSS  Patient Goals: discharge  Family Goals: PARTH    Progress made toward(s) clinical / shift goals:    Problem: Knowledge Deficit - Standard  Goal: Patient and family/care givers will demonstrate understanding of plan of care, disease process/condition, diagnostic tests and medications  Outcome: Progressing     Problem: Safety  Goal: Will remain free from injury  Outcome: Progressing     Problem: Pain Management  Goal: Pain level will decrease to patient's comfort goal  Outcome: Progressing       Patient is not progressing towards the following goals:

## 2025-07-08 NOTE — DISCHARGE SUMMARY
Discharge Summary    CHIEF COMPLAINT ON ADMISSION  No chief complaint on file.      Reason for Admission  Cardiology (Valve Endocarditis)     Admission Date  7/4/2025    CODE STATUS  Full    HPI & HOSPITAL COURSE  70 yo man history of TAVR who had fever and had a course of outpatient antibiotics.  He also had blood cultures in the ER that were positive and he was referred to [facility].  Cardiology and infectious disease were consulted.  Blood cultures grew Streptococcus mitis, repeat blood cultures were negative.  SHAHLA was negative for endocarditis.  He had an x-ray panoramic mandible that was negative for any acute findings, showed multiple dental implants.  Infectious disease recommends he continue on 2 weeks of antibiotics, he will discharge on linezolid to finish 7/20.  I discussed with the patient to have a CBC in 1 week and follow-up with his PCP.  Patient is to continue prophylactic amoxicillin 2 g prior to any dental procedures and cleanings.    Therefore, he is discharged in good and stable condition to home with close outpatient follow-up.    The patient met 2-midnight criteria for an inpatient stay at the time of discharge.    Discharge Date  7/8/2025    FOLLOW UP ITEMS POST DISCHARGE  CBC in 1 week to monitor for cytopenia while on linezolid which completes 7/20    DISCHARGE DIAGNOSES  Principal Problem:    Bacteremia due to Streptococcus (POA: Yes)  Active Problems:    Postoperative hypothyroidism (POA: Yes)    S/P TAVR (transcatheter aortic valve replacement) (POA: Yes)      Overview: Successful implantation of a 26 Tim Hortencia S3 Ultra Resilia deployed       at nominal volume +1 cc via the transfemoral approach on 6/17/2024 under       general anesthesia  Resolved Problems:    SIRS (systemic inflammatory response syndrome) (HCC) (POA: Yes)      FOLLOW UP  Future Appointments   Date Time Provider Department Center   9/9/2025  8:00 AM Rafal Renee M.D. [facility]     Royce Muniz,  A.P.R.N.  [address] [facility] [address]  [place] [address]  [phone]    Follow up  If symptoms worsen      MEDICATIONS ON DISCHARGE     Medication List        START taking these medications        Instructions   linezolid 600 MG Tabs  Commonly known as: Zyvox   Take 1 Tablet by mouth 2 times a day for 13 days.  Dose: 600 mg            CONTINUE taking these medications        Instructions   aspirin 81 MG EC tablet   Take 1 Tablet by mouth every day.  Dose: 81 mg     cyanocobalamin 100 MCG Tabs  Commonly known as: Vitamin B-12   Take 100 mcg by mouth every day.  Dose: 100 mcg     levothyroxine 125 MCG Tabs  Commonly known as: Synthroid   Take 1 tablet by mouth once daily  Dose: 125 mcg     MAGNESIA PO   Take  by mouth.     multivitamin Tabs   Take 1 Tablet by mouth every day.  Dose: 1 Tablet     POTASSIMIN PO   Take  by mouth.            STOP taking these medications      amoxicillin-clavulanate 875-125 MG Tabs  Commonly known as: Augmentin     cephALEXin 500 MG Caps  Commonly known as: Keflex     doxycycline 100 MG Tabs  Commonly known as: Vibramycin              Allergies  Allergies[1]    DIET  No orders of the defined types were placed in this encounter.      ACTIVITY  As tolerated.    CONSULTATIONS  Cardiology, infectious disease    PROCEDURES  EC-SHAHLA W/O CONT   Final Result      [id number]   Final Result      No acute osseous abnormality.            LABORATORY  Lab Results   Component Value Date    SODIUM 136 07/08/2025    POTASSIUM 4.9 07/08/2025    CHLORIDE 102 07/08/2025    CO2 22 07/08/2025    GLUCOSE 167 (H) 07/08/2025    BUN 18 07/08/2025    CREATININE 0.99 07/08/2025        Lab Results   Component Value Date    WBC 12.3 (H) 07/08/2025    HEMOGLOBIN 13.4 (L) 07/08/2025    HEMATOCRIT 40.4 (L) 07/08/2025    PLATELETCT 208 07/08/2025        Total time of the discharge process exceeds 32 minutes.       [1] No Known Allergies

## 2025-07-11 LAB
BACTERIA BLD CULT: NORMAL
BACTERIA BLD CULT: NORMAL
SIGNIFICANT IND 70042: NORMAL
SIGNIFICANT IND 70042: NORMAL
SITE SITE: NORMAL
SITE SITE: NORMAL
SOURCE SOURCE: NORMAL
SOURCE SOURCE: NORMAL

## 2025-07-17 ENCOUNTER — TELEPHONE (OUTPATIENT)
Dept: INFECTIOUS DISEASES | Facility: MEDICAL CENTER | Age: 70
End: 2025-07-17
Payer: MEDICARE

## 2025-07-17 DIAGNOSIS — R78.81 BACTEREMIA DUE TO STREPTOCOCCUS: Primary | ICD-10-CM

## 2025-07-17 DIAGNOSIS — B95.5 BACTEREMIA DUE TO STREPTOCOCCUS: Primary | ICD-10-CM

## 2025-07-17 NOTE — TELEPHONE ENCOUNTER
Phone Number Called: [phone]    Call outcome: Spoke to patient regarding message below.    Message: Pt's PCP reviewed recent CBC results and advised pt to follow up with [facility] to review and determine continuation of Zyvox. Pt denies any symptoms but reported sweating after activity.     No active ID referral

## 2025-07-17 NOTE — TELEPHONE ENCOUNTER
VOICEMAIL  1. Caller Name: Rick                      Call Back Number: [phone]    2. Message: Pt called office to make an appt and discuss abx tx.    3. Patient approves office to leave a detailed voicemail/MyChart message: yes

## 2025-07-17 NOTE — Clinical Note
REFERRAL APPROVAL NOTICE         Sent on July 17, 2025                   Rick Avitia  [address] [facility] [address]                   Dear Mr. Avitia,    After a careful review of the medical information and benefit coverage, [facility] has processed your referral. See below for additional details.    If applicable, you must be actively enrolled with your insurance for coverage of the authorized service. If you have any questions regarding your coverage, please contact your insurance directly.    REFERRAL INFORMATION   Referral #:  [id number]  Referred-To Department    Referred-By Provider:  Infectious Diseases    Penny Stack M.D.   [facility]      [address] [place] [address]  [facility] [address]  [phone] [address] [place] [address]  [facility] [id number] [phone]    Referral Start Date:  07/17/2025  Referral End Date:   07/17/2026             SCHEDULING  If you do not already have an appointment, please call [phone] to make an appointment.     MORE INFORMATION  If you do not already have a [employer] account, sign up at: [email or web address]  You can access your medical information, make appointments, see lab results, billing information, and more.  If you have questions regarding this referral, please contact  the [facility] Referrals department at:             [phone]. Monday - Friday 8:00AM - 5:00PM.     Sincerely,    [facility]

## 2025-07-17 NOTE — TELEPHONE ENCOUNTER
Phone Number Called: [phone]    Call outcome: Spoke to patient regarding message below.    Message: Relayed MD message to pt, pt to complete blood cultures ordered by PCP after completing abx therapy.

## 2025-07-17 NOTE — TELEPHONE ENCOUNTER
Pt scheduled for follow up on 7/29. Pt will be out of abx on 7/21, requesting provider review CBC from 7/14 to determine if he will need a refill. Pt requesting response prior to follow up

## 2025-07-27 ASSESSMENT — ENCOUNTER SYMPTOMS
WEIGHT LOSS: 0
SPUTUM PRODUCTION: 0
BRUISES/BLEEDS EASILY: 0
HEADACHES: 0
BLURRED VISION: 0
NAUSEA: 0
FOCAL WEAKNESS: 0
NERVOUS/ANXIOUS: 0
PALPITATIONS: 0
CHILLS: 0
SHORTNESS OF BREATH: 0
WHEEZING: 0
ABDOMINAL PAIN: 0
MYALGIAS: 0
DIZZINESS: 0
DOUBLE VISION: 0
FEVER: 0
VOMITING: 0

## 2025-07-28 NOTE — PROGRESS NOTES
INFECTIOUS  DISEASE  OUTPATIENT CLINIC  NOTE   Subjective   Primary care provider: Rafal Renee M.D..     Reason for Follow Up:   Follow-up for   1. Bacteremia due to Streptococcus        2. S/P TAVR (transcatheter aortic valve replacement)            HPI: Patient previously seen and treated by ID team as inpatient during hospital admission.   Venkat Avitia is a  69 y.o. man with history of severe aortic stenosis status post TAVR in June 2024 admitted on 7/4/2025 from [facility] where he presented with persistent fevers.  Blood cultures are positive for Streptococcus mitis.  Given presence of TAVR, and clinical presentation, concern for infective endocarditis. Blood cultures on 7/3+ Streptococcus mitis, pan susceptible. Repeat blood cultures on 7/6-negative to date.  SHAHLA no evidence of infective endocarditis.  2-week course of biotics,.  At discharge linezolid 600 mg twice daily from 7/16 with stop date of 7/20/2025.       07/29/25- Today Patient reports feeling well. Denies feeling generally ill, fevers/chills, general malaise, headache, n/v/d.  Completed Zyvox on 7/20/2025 and repeated blood cultures on 7/25/2025.  Blood cultures in care everywhere negative to date.  Most recent labs CBC from 7/15/2025 WBC 10.0, previous anemia resolved, neutrophils WNL, mild elevation in eosinophils but possibly due to allergies as patient has reports of mild cough.  Education provided on signs and symptoms of recurrence infection and when report to ER/call 911.  Recommend continued follow-up with cardiology.    Current Antimicrobials: None  Previous Antimicrobials: Zyvox    Other Current Medications:  Home Medications   Medication Sig Taking? Last Dose Authorizing Provider   levothyroxine (SYNTHROID) 125 MCG Tab Take 1 tablet by mouth once daily Yes  Rafal Renee M.D.   Magnesium Hydroxide (MAGNESIA PO) Take  by mouth. Yes  Physician Outpatient   Potassium (POTASSIMIN PO) Take  by mouth.  Yes  Physician Outpatient   aspirin 81 MG EC tablet Take 1 Tablet by mouth every day. Yes  ROSA Nair   cyanocobalamin (VITAMIN B-12) 100 MCG Tab Take 100 mcg by mouth every day. Yes  Physician Outpatient   multivitamin Tab Take 1 Tablet by mouth every day. Yes  Physician Outpatient        PMH:  Past Medical History[1]  Past Surgical History[2]  Family History   Problem Relation Age of Onset    Stroke Father     Prostate cancer Brother     Arthritis Other     Heart Disease Other     Hypertension Other     Stroke Other      Social History     Socioeconomic History    Marital status:      Spouse name: Nichole    Number of children: 2    Years of education: Not on file    Highest education level: Not on file   Occupational History    Occupation: retired     Comment: [employer]   Tobacco Use    Smoking status: Never     Passive exposure: Never    Smokeless tobacco: Never   Vaping Use    Vaping status: Never Used   Substance and Sexual Activity    Alcohol use: Yes     Alcohol/week: 4.2 - 8.4 oz     Types: 7 - 14 Standard drinks or equivalent per week     Comment: socially    Drug use: Never    Sexual activity: Not on file   Other Topics Concern     Service No    Blood Transfusions Not Asked    Caffeine Concern No    Occupational Exposure Not Asked    Hobby Hazards Not Asked    Sleep Concern Not Asked    Stress Concern Not Asked    Weight Concern Not Asked    Special Diet Not Asked    Back Care Not Asked    Exercise No    Bike Helmet Not Asked    Seat Belt Yes    Self-Exams Not Asked   Social History Narrative    Not on file     Social Drivers of Health     Financial Resource Strain: Not on file   Food Insecurity: No Food Insecurity (7/7/2025)    Hunger Vital Sign     Worried About Running Out of Food in the Last Year: Never true     Ran Out of Food in the Last Year: Never true   Transportation Needs: No Transportation Needs (7/7/2025)    PRAPARE - Transportation     Lack of  "Transportation (Medical): No     Lack of Transportation (Non-Medical): No   Physical Activity: Inactive (8/30/2024)    Exercise Vital Sign     Days of Exercise per Week: 0 days     Minutes of Exercise per Session: 0 min   Stress: Not on file   Social Connections: Not on file   Intimate Partner Violence: Not At Risk (7/4/2025)    Humiliation, Afraid, Rape, and Kick questionnaire     Fear of Current or Ex-Partner: No     Emotionally Abused: No     Physically Abused: No     Sexually Abused: No   Housing Stability: Low Risk  (7/7/2025)    Housing Stability Vital Sign     Unable to Pay for Housing in the Last Year: No     Number of Times Moved in the Last Year: 0     Homeless in the Last Year: No           Allergies/Intolerances:  Allergies[3]    ROS:   Review of Systems   Constitutional:  Negative for chills, fever, malaise/fatigue and weight loss.   HENT:  Negative for congestion and hearing loss.    Eyes:  Negative for blurred vision and double vision.   Respiratory:  Positive for cough. Negative for sputum production, shortness of breath and wheezing.    Cardiovascular:  Negative for chest pain and palpitations.   Gastrointestinal:  Negative for abdominal pain, constipation, diarrhea, nausea and vomiting.   Genitourinary:  Negative for dysuria.   Musculoskeletal:  Negative for myalgias.   Skin:  Negative for itching and rash.   Neurological:  Negative for dizziness, focal weakness and headaches.   Endo/Heme/Allergies:  Does not bruise/bleed easily.   Psychiatric/Behavioral:  The patient is not nervous/anxious.       ROS was reviewed and were negative except as above.    Objective    Most Recent Vital Signs:  /58   Pulse 93   Temp 36.3 °C (97.3 °F) (Temporal)   Resp 18   Ht 1.854 m (6' 1\")   Wt 93.8 kg (206 lb 14.4 oz)   SpO2 93%   BMI 27.30 kg/m²     Physical Exam:  Physical Exam  Vitals reviewed.   Constitutional:       Appearance: Normal appearance.   HENT:      Head: Normocephalic and atraumatic.      " Nose: Nose normal.      Mouth/Throat:      Mouth: Mucous membranes are moist.   Eyes:      Pupils: Pupils are equal, round, and reactive to light.   Cardiovascular:      Rate and Rhythm: Normal rate and regular rhythm.   Pulmonary:      Effort: Pulmonary effort is normal. No respiratory distress.      Breath sounds: Normal breath sounds. No stridor. No wheezing, rhonchi or rales.   Chest:      Chest wall: No tenderness.   Musculoskeletal:         General: No tenderness.      Cervical back: Normal range of motion and neck supple.   Skin:     General: Skin is warm and dry.      Coloration: Skin is not jaundiced.      Findings: No erythema or rash.   Neurological:      Mental Status: He is alert and oriented to person, place, and time.      Motor: No weakness.   Psychiatric:         Mood and Affect: Mood normal.         Behavior: Behavior normal.          Pertinent Lab/Imaging Results:  [unfilled]  @CMP@  WBC   Date/Time Value Ref Range Status   07/14/2025 08:28 AM 10.0 4.8 - 10.8 K/uL Final     RBC   Date/Time Value Ref Range Status   07/14/2025 08:28 AM 5.00 4.70 - 6.10 M/uL Final     Hemoglobin   Date/Time Value Ref Range Status   07/14/2025 08:28 AM 14.4 14.0 - 18.0 g/dL Final     Hematocrit   Date/Time Value Ref Range Status   07/14/2025 08:28 AM 42.6 42.0 - 52.0 % Final     MCV   Date/Time Value Ref Range Status   07/14/2025 08:28 AM 85.2 80.0 - 94.0 fL Final     MCH   Date/Time Value Ref Range Status   07/14/2025 08:28 AM 28.8 27.0 - 31.0 pg Final     MCHC   Date/Time Value Ref Range Status   07/14/2025 08:28 AM 33.8 33.0 - 37.0 g/dL Final     MPV   Date/Time Value Ref Range Status   07/14/2025 08:28 AM 11.6 (H) 7.4 - 10.4 fL Final      Sodium   Date/Time Value Ref Range Status   07/08/2025 01:23  135 - 145 mmol/L Final     Potassium   Date/Time Value Ref Range Status   07/08/2025 01:23 AM 4.9 3.6 - 5.5 mmol/L Final     Comment:     The hemolysis index of the specimen exceeds the allowed tolerance for  "the  test. Result may be affected.?Specimen recollection is recommended to  confirm the result.       Chloride   Date/Time Value Ref Range Status   07/08/2025 01:23  96 - 112 mmol/L Final     Co2   Date/Time Value Ref Range Status   07/08/2025 01:23 AM 22 20 - 33 mmol/L Final     Glucose   Date/Time Value Ref Range Status   07/08/2025 01:23  (H) 65 - 99 mg/dL Final     Bun   Date/Time Value Ref Range Status   07/08/2025 01:23 AM 18 8 - 22 mg/dL Final     Creatinine   Date/Time Value Ref Range Status   07/08/2025 01:23 AM 0.99 0.50 - 1.40 mg/dL Final     Bun-Creatinine Ratio   Date/Time Value Ref Range Status   07/26/2017 10:15 AM 16 10 - 24 Final     Alkaline Phosphatase   Date/Time Value Ref Range Status   07/04/2025 03:52 PM 74 30 - 99 U/L Final     AST(SGOT)   Date/Time Value Ref Range Status   07/04/2025 03:52 PM 25 12 - 45 U/L Final     ALT(SGPT)   Date/Time Value Ref Range Status   07/04/2025 03:52 PM 18 2 - 50 U/L Final     Total Bilirubin   Date/Time Value Ref Range Status   07/04/2025 03:52 PM 0.6 0.1 - 1.5 mg/dL Final      No results found for: \"CPKTOTAL\"       Blood Culture   Date Value Ref Range Status   07/03/2025 Growth detected by automated blood culture system. (A)  Final   07/03/2025 Streptococcus mitis/oralis (A)  Final       Blood Culture   Date Value Ref Range Status   07/03/2025 Growth detected by automated blood culture system. (A)  Final   07/03/2025 Streptococcus mitis/oralis (A)  Final    BLOOD CULTURE (POSITIVE WORKUP)  Order: [id number] - Reflex for Order [id number]   Status: Final result       Next appt: 07/29/2025 at 09:00 AM in Infectious Diseases (Royce Muniz A.P.R.N.)    Test Result Released: Yes (not seen)    Specimen Information: Blood   0 Result Notes      Component  Ref Range & Units (hover) 3 wk ago   Significant Indicator POS Positive (POS)   Source BLD   Site Peripheral   Blood Culture Growth detected by automated blood culture system. Abnormal    Blood Culture " Streptococcus mitis/oralis Abnormal    Resulting Agency B        Susceptibility     Streptococcus mitis/oralis     YANETH     Ampicillin <=0.25 mcg/mL Sensitive     Ceftriaxone <=0.12 mcg/mL Sensitive     Levofloxacin 1 mcg/mL Sensitive     Vancomycin 0.5 mcg/mL Sensitive                  Narrative  Performed by: B  CER tel. [phone] 07/04/2025, 17:10, RB PERF. RESULTS CALLED TO:ewa rodríguez rn   Specimen Collected: 07/03/25 11:58 AM Last Resulted: 07/06/25  7:26 AM      Latest Reference Range & Units 07/03/25 11:50 07/03/25 11:58 07/04/25 09:55 07/04/25 10:00 07/06/25 09:01 07/06/25 13:06 07/25/25 08:23 07/25/25 08:30   Adenovirus, PCR Not Detected  Not Detected          B. parapertussis, PCR Not Detected  Not Detected          B. pertussis, PCR Not Detected  Not Detected          Blood Culture (Source )  Right AC left AC R AC (C) L AC   Peripheral Peripheral   Chlamydia pneumoniae, PCR Not Detected  Not Detected          Coronavirus 229E, PCR Not Detected  Not Detected          Coronavirus HKU1, PCR Not Detected  Not Detected          Coronavirus NL63, PCR Not Detected  Not Detected          Coronavirus OC43, PCR Not Detected  Not Detected          Final Report  PresumpPos PresumpPos No Growth No Growth       Human Metapneumovirus, PCR Not Detected  Not Detected          Mycoplasma pneumoniae, PCR Not Detected  Not Detected          Parainfluenza virus 1, PCR Not Detected  Not Detected          Parainfluenza virus 2, PCR Not Detected  Not Detected          Parainfluenza virus 3, PCR Not Detected  Not Detected          Parainfluenza 4, PCR Not Detected  Not Detected          Preliminary Report  PresumpPos PresumpPos No Growth No Growth       Rhinovirus / Enterovirus, PCR Not Detected  Not Detected          RSV (Respiratory Syncytial Virus), PCR  Not Detected          SARS-CoV-2 (COVID-19) RNA by WILLEM Not Detected  Not Detected          Significant Indicator  POS ! POS !   NEG NEG     Site  Peripheral Peripheral    PERIPHERAL PERIPHERAL     Source  BLD BLD   BLD BLD     !: Data is abnormal  (C): Corrected  EC-SHAHLA W/O CONT  Order: [id number]   Status: Final result       Next appt: 2025 at 09:00 AM in Infectious Diseases (ROSA Miller)    Test Result Released: Yes (seen)    0 Result Notes  Details    Reading Physician Reading Date Result Priority   No Reading Provider Prelim 2025    Sam Escamilla M.D.  [phone] 2025      Result Text  Transesophageal Echo Report        Echocardiography Laboratory  CONCLUSIONS  No evidence of endocarditis.      JUANJOSE ALBARADO  Exam Date:          2025                       07:57  Exam Location:      Inpatient  Priority:            Routine     Ordering Physician:        MAGY CASTRO     Referring Physician:  Sonographer:               Katharine Streeter RDCS     Age:    69     Gender:    M  MRN:    [id number]  :    1955  BSA:           Ht (in):           Wt (lb):  Report Type:      Complete  Indications:     Endocarditis  ICD Codes:       421  CPT Codes:       36227  BP:   107    /   70     HR:  Technical Quality:     MEASUREMENTS  (Male / Female) Normal Values  2D ECHO  Estimated LV Ejection Fraction    65 %                       DOPPLER  AI Pressure Half Time             853 ms                     * Indicates values subject to auto-interpretation  LV EF:  65    %     Medications        Limitations        Complications        Proc. Components  The probe was inserted and manipulated by Dr. Escamilla. Epiq probe # 5   was used for this procedure. 2D, color Doppler and spectral Doppler   imaging were used as part of the evaluation as clinically indicated.     FINDINGS  Left Ventricle  Normal left ventricular size and systolic function.     Right Ventricle  Normal right ventricular size and systolic function.     Right Atrium  Normal right atrial size.     Left Atrium  Mildly dilated left atrium.     LA Appendage  Normal left atrial appendage. No thrombus  detected in the left atrial   appendage.     IA Septum  Normal interatrial septum.     IV Septum  Normal Interventricular Septum.     Mitral Valve  Structurally normal mitral valve without significant stenosis or   regurgitation.     Aortic Valve  Known TAVR aortic valve that is functioning normally. Mild paravalvular   regurgitation.     Tricuspid Valve  Structurally normal tricuspid valve without significant stenosis or   regurgitation.     Pulmonic Valve  Structurally normal pulmonic valve without significant stenosis or   regurgitation.     Pericardium  Normal pericardium without effusion.     Aorta  Normal aortic root for body surface area.     Sam Escamilla MD  (Electronically Signed)  Final Date:     07 July 2025                   09:57     All Measurements        Exam Ended: 07/07/25  8:44 AM Last Resulted: 07/07/25  9:58 AM           Impression/Assessment      1. Bacteremia due to Streptococcus        2. S/P TAVR (transcatheter aortic valve replacement)            Venkat Avitia is a  69 y.o. man with history of severe aortic stenosis status post TAVR in June 2024 admitted on 7/4/2025 from [facility] where he presented with persistent fevers.  Blood cultures are positive for Streptococcus mitis.  Given presence of TAVR, and clinical presentation, concern for infective endocarditis. Blood cultures on 7/3+ Streptococcus mitis, pan susceptible. Repeat blood cultures on 7/6-negative to date.  SHAHLA no evidence of infective endocarditis.  2-week course of antibiotics, discharge linezolid 600 mg twice daily from 7/16 with stop date of 7/20/2025.     - This infection/ chronic illness posses a threat to life, bodily function, and limb preservation   PLAN:   - Completed 2-week course of Zyvox on 7/20/2025.  No further need for antibiotic therapy.  - Repeat blood cultures while off antibiotic therapy negative to date.  - Medication education provided and S/S of side effects discussed   -  Recommend routine follow up with PCP/cardiology  - Recommended prophylactic antibiotics prior to dental procedures  - Education provided on signs and symptoms of recurrence of infection and report to ER/call 911            Return visit: As needed. Follow up with primary care physician for chronic medical problems    I have performed a physical exam,  updated ROS and plan today. I have reviewed previous images, labs, and provider notes.      JESSE Miller.    All Patients should seek medical re-evaluation or report to the ER for new, increasing or worsening symptoms. In some circumstances medical conditions can change from the initial evaluation and may require emergent medical re-evaluation. This includes but is not limited to chest pain, shortness of breath, atypical abdominal pain, atypical headache, ALOC, fever >101, low blood pressure, high respiratory rate (above 30), low oxygen saturation (below 90%), acute delirium, abnormal bleeding, inability to tolerate any intake, weakness on one side of the body, any worsened or concerning conditions.    Please note that this dictation was created using voice recognition software. I have worked with technical experts from [employer] to optimize the interface.  I have made every reasonable attempt to correct obvious errors, but there may be errors of grammar and possibly content that I did not discover before finalizing the note.         [1]   Past Medical History:  Diagnosis Date    BPH (benign prostatic hyperplasia)     Heart burn     Mild mitral regurgitation by prior echocardiogram 09/2013    nl ef and mild concentric LVH    Nonrheumatic aortic valve stenosis 10/14/2019    LUIS (obstructive sleep apnea)     [place] Pulm    Postoperative hypothyroidism 10/26/2015    Pre-diabetes     S/P TAVR (transcatheter aortic valve replacement)    [2]   Past Surgical History:  Procedure Laterality Date    TRANSCATHETER AORTIC VALVE REPLACEMENT Bilateral 6/17/2024     Procedure: TRANSCATHETER AORTIC VALVE REPLACEMENT;  Surgeon: Scotty De Dios M.D.;  Location: SURGERY [facility];  Service: Cardiac    ECHOCARDIOGRAM, TRANSESOPHAGEAL, INTRAOPERATIVE N/A 6/17/2024    Procedure: ECHOCARDIOGRAM, TRANSESOPHAGEAL, INTRAOPERATIVE;  Surgeon: Scotty De Dios M.D.;  Location: SURGERY [facility];  Service: Cardiac    CHOLECYSTECTOMY      [facility] OTHER 1970    THYROID LOBECTOMY      TURP-VAPOR     [3] No Known Allergies

## 2025-07-29 ENCOUNTER — OFFICE VISIT (OUTPATIENT)
Dept: INFECTIOUS DISEASES | Facility: MEDICAL CENTER | Age: 70
End: 2025-07-29
Attending: NURSE PRACTITIONER
Payer: MEDICARE

## 2025-07-29 VITALS
HEIGHT: 73 IN | RESPIRATION RATE: 18 BRPM | HEART RATE: 93 BPM | TEMPERATURE: 97.3 F | WEIGHT: 206.9 LBS | OXYGEN SATURATION: 93 % | BODY MASS INDEX: 27.42 KG/M2 | DIASTOLIC BLOOD PRESSURE: 58 MMHG | SYSTOLIC BLOOD PRESSURE: 122 MMHG

## 2025-07-29 DIAGNOSIS — B95.5 BACTEREMIA DUE TO STREPTOCOCCUS: Primary | ICD-10-CM

## 2025-07-29 DIAGNOSIS — Z95.2 S/P TAVR (TRANSCATHETER AORTIC VALVE REPLACEMENT): ICD-10-CM

## 2025-07-29 DIAGNOSIS — R78.81 BACTEREMIA DUE TO STREPTOCOCCUS: Primary | ICD-10-CM

## 2025-07-29 PROCEDURE — 99214 OFFICE O/P EST MOD 30 MIN: CPT | Performed by: NURSE PRACTITIONER

## 2025-07-29 PROCEDURE — 3078F DIAST BP <80 MM HG: CPT | Performed by: NURSE PRACTITIONER

## 2025-07-29 PROCEDURE — 99212 OFFICE O/P EST SF 10 MIN: CPT | Performed by: NURSE PRACTITIONER

## 2025-07-29 PROCEDURE — 3074F SYST BP LT 130 MM HG: CPT | Performed by: NURSE PRACTITIONER

## 2025-07-29 ASSESSMENT — ENCOUNTER SYMPTOMS
COUGH: 1
DIARRHEA: 0
CONSTIPATION: 0

## 2025-07-29 ASSESSMENT — FIBROSIS 4 INDEX: FIB4 SCORE: 1.35

## (undated) DEVICE — GLOVE BIOGEL SZ 6.5 SURGICAL PF LTX (50PR/BX 4BX/CA)

## (undated) DEVICE — DEVICE INFLATION DIGITAL BLUE DIAMOND (5EA/BX)

## (undated) DEVICE — COVER FOOT UNIVERSAL DISP. - (25EA/CA)

## (undated) DEVICE — SET EXTENSION WITH 2 PORTS (48EA/CA) ***PART #2C8610 IS A SUBSTITUTE*****

## (undated) DEVICE — DRAPE MAYO STAND - (30/CA)

## (undated) DEVICE — Device

## (undated) DEVICE — GOWN SURGICAL XX-LARGE - (28EA/CA) SIRUS NON REINFORCED

## (undated) DEVICE — TUBE E-T HI-LO CUFF 7.5MM (10EA/PK)

## (undated) DEVICE — CRIMPER CATHETER EDWARDS DISPOSABLE (1EA)

## (undated) DEVICE — ELECTRODE RADIOLUCNT SOLID GEL DEFIB PADS (12EA/CA)

## (undated) DEVICE — GLOVE SIZE 6.5  SURGEON ACCELERATOR FREE GREEN (50PR/BX)

## (undated) DEVICE — INTRODUCER SHEATH 6FR 2.5CM - DILATOR PROTRUDING (10/BX)

## (undated) DEVICE — WIRE GUIDE AES .035 260CM WITH 3MM J TIP"

## (undated) DEVICE — DECANTER FLD BLS - (50/CA)

## (undated) DEVICE — GLOVE BIOGEL INDICATOR SZ 6.5 SURGICAL PF LTX - (50PR/BX 4BX/CA)

## (undated) DEVICE — WIRE GUIDE LUNDQST.035X180 - TSMG-35-180-4-LES ORDER BY BOX (5EA/BX)

## (undated) DEVICE — SUTURE DEVICE CLOSURE REPAIR SYSTEM PERCLOSE PROSTYLE (10EA/PK)

## (undated) DEVICE — BLADE SURGICAL #11 - (50/BX)

## (undated) DEVICE — COVER LIGHT HANDLE FLEXIBLE - SOFT (2EA/PK 80PK/CA)

## (undated) DEVICE — DEVICE INFLATION NOVAFLEX ATRION LOCK SYRINGE 29MM 38ML (1EA)

## (undated) DEVICE — SHEATH DESTINATION WITH DILATOR 6FR 45CM

## (undated) DEVICE — GLOVE SZ 7 BIOGEL PI MICRO - PF LF (50PR/BX 4BX/CA)

## (undated) DEVICE — TOWELS CLOTH SURGICAL - (4/PK 20PK/CA)

## (undated) DEVICE — SYSTEM DELIVERY COMMANDER TAVR KIT 26MM COMPONENT (1EA)

## (undated) DEVICE — SUTURE  0 ETHIBOND CT-1 30 IN (36PK/BX)

## (undated) DEVICE — KIT RETROFIT PROBE COVERS (24EA/EA)

## (undated) DEVICE — BLANKET UNDERBODY FULL ACCES - (5/CA)

## (undated) DEVICE — CANISTER SUCTION 3000ML MECHANICAL FILTER AUTO SHUTOFF MEDI-VAC NONSTERILE LF DISP  (40EA/CA)

## (undated) DEVICE — SHEATH RO 6F 25CM (10EA/BX)

## (undated) DEVICE — DRAPE CLEAR W/ELASTIC BAND RAD CARM 40 X40" (20EA/CA)"

## (undated) DEVICE — CHLORAPREP 26 ML APPLICATOR - ORANGE TINT(25/CA)

## (undated) DEVICE — SUCTION INSTRUMENT YANKAUER BULBOUS TIP W/O VENT (50EA/CA)

## (undated) DEVICE — LACTATED RINGERS INJ 1000 ML - (14EA/CA 60CA/PF)

## (undated) DEVICE — INTRODUCER CATHETER  DILATOR PROTRUDING 8FR 2.5CM (10EA/BX)

## (undated) DEVICE — CATHETER PIGTAIL 6FR 145 (5EA/BX)

## (undated) DEVICE — ELECTRODE DUAL RETURN W/ CORD - (50/PK)

## (undated) DEVICE — STOPCOCK IV 400 PSI 3W ROT (50EA/BX)

## (undated) DEVICE — CATHETER EP 6FR 90CM FEM BP J CRV (J-TIP)

## (undated) DEVICE — GLIDESHEATH SLENDER NITINOL KIT .021 GW 6FR 10CM SINGLE WALL

## (undated) DEVICE — IV TUBING HI-FLO RATE W/CLAMP (50/CA)

## (undated) DEVICE — TUBING CLEARLINK DUO-VENT - C-FLO (48EA/CA)

## (undated) DEVICE — DEVICE INFLATION ATRION NOVALFEX TRANSFEMORAL SYSTEM (1EA)

## (undated) DEVICE — CABLE TEMPORARY PACING

## (undated) DEVICE — SENSOR OXIMETER ADULT SPO2 RD SET (20EA/BX)

## (undated) DEVICE — CATHETER 6FR AL1 100CM (5/BX)

## (undated) DEVICE — GUIDEWIRE STARTER STRAIGHT FIXED CORE .035 150CM 4 STRAIGHT PTFE/HEPARIN COATED (10/BX)

## (undated) DEVICE — SYR ANGIO CNRST INJ HI-PRS 3W 65 - (10EA/CA)"

## (undated) DEVICE — PACK TAVR (3EA/CA)